# Patient Record
Sex: FEMALE | Race: WHITE | NOT HISPANIC OR LATINO | Employment: FULL TIME | URBAN - METROPOLITAN AREA
[De-identification: names, ages, dates, MRNs, and addresses within clinical notes are randomized per-mention and may not be internally consistent; named-entity substitution may affect disease eponyms.]

---

## 2023-10-17 ENCOUNTER — PROCEDURE VISIT (OUTPATIENT)
Dept: OBGYN CLINIC | Facility: CLINIC | Age: 28
End: 2023-10-17
Payer: COMMERCIAL

## 2023-10-17 VITALS
SYSTOLIC BLOOD PRESSURE: 114 MMHG | DIASTOLIC BLOOD PRESSURE: 72 MMHG | HEIGHT: 62 IN | WEIGHT: 162.8 LBS | BODY MASS INDEX: 29.96 KG/M2

## 2023-10-17 DIAGNOSIS — Z30.432 ENCOUNTER FOR IUD REMOVAL: Primary | ICD-10-CM

## 2023-10-17 PROBLEM — R27.0 ATAXIA: Status: ACTIVE | Noted: 2023-10-17

## 2023-10-17 PROCEDURE — 58301 REMOVE INTRAUTERINE DEVICE: CPT | Performed by: PHYSICIAN ASSISTANT

## 2023-10-17 NOTE — PROGRESS NOTES
Assessment/Plan:    Encounter for IUD removal  IUD removed intact without difficulty. Reviewed recommendation to start a prenatal vitamin with folic acid [id number] 1-3 months prior to conception to decrease risk of neural tube defects such as spina bifida. Call office with positive pregnancy test.   Return to office for annual or as needed. Problem List Items Addressed This Visit          Other    Encounter for IUD removal - Primary     IUD removed intact without difficulty. Reviewed recommendation to start a prenatal vitamin with folic acid [id number] 1-3 months prior to conception to decrease risk of neural tube defects such as spina bifida. Call office with positive pregnancy test.   Return to office for annual or as needed. Relevant Orders    Iud removal         Subjective:      Patient ID: Rose Marie Wang is a 33 y.o. female. HPI  32 yo seen for Mirena IUD removal. Patient had Mirena IUD inserted 2018. Planning to conceive. The following portions of the patient's history were reviewed and updated as appropriate: She  has no past medical history on file. She   Patient Active Problem List    Diagnosis Date Noted    Encounter for IUD removal 10/17/2023    Ataxia 10/17/2023    Viral meningitis 04/19/2016    Mononucleosis 04/16/2016     She  has a past surgical history that includes INSERTION OF INTRAUTERINE DEVICE (IUD) (02/2018). Her family history is not on file. She  reports that she has never smoked. She has never used smokeless tobacco. She reports current alcohol use. She reports that she does not use drugs.   Current Outpatient Medications   Medication Sig Dispense Refill    Biotin 10 MG TABS Take 1 tablet by mouth in the morning      Cholecalciferol (Vitamin D-3) 25 MCG (1000 UT) CAPS Take 1 capsule by mouth in the morning      ELDERBERRY PO Take 1 gum by mouth in the morning      levonorgestrel (MIRENA) 20 MCG/24HR IUD 1 each by Intrauterine route       No current facility-administered medications for this visit. She is allergic to amoxicillin, amoxicillin-pot clavulanate, and cefdinir. .    Review of Systems   Constitutional:  Negative for fatigue, fever and unexpected weight change. HENT:  Negative for dental problem and sinus pressure. Eyes:  Negative for visual disturbance. Respiratory:  Negative for cough, shortness of breath and wheezing. Cardiovascular:  Negative for chest pain. Gastrointestinal:  Negative for abdominal pain, blood in stool, constipation, diarrhea, nausea and vomiting. Endocrine: Negative for polydipsia. Genitourinary:  Negative for difficulty urinating, dyspareunia, dysuria, frequency, hematuria, pelvic pain and urgency. Musculoskeletal:  Negative for arthralgias and back pain. Neurological:  Negative for dizziness, seizures, light-headedness and headaches. Psychiatric/Behavioral:  Negative for suicidal ideas. The patient is not nervous/anxious. Objective:      /72 (BP Location: Left arm, Patient Position: Sitting, Cuff Size: Standard)   Ht 5' 2" (1.575 m)   Wt 73.8 kg (162 lb 12.8 oz)   Breastfeeding No   BMI 29.78 kg/m²          Physical Exam  Constitutional:       Appearance: She is well-developed. Genitourinary:     Labia:         Right: No rash, tenderness, lesion or injury. Left: No rash, tenderness, lesion or injury. Urethra: No prolapse, urethral pain, urethral swelling or urethral lesion. Vagina: No signs of injury and foreign body. No vaginal discharge, erythema, tenderness or bleeding. Cervix: No cervical motion tenderness, discharge or friability. Skin:     General: Skin is warm and dry. Coloration: Skin is not pale. Findings: No erythema or rash. Neurological:      Mental Status: She is alert and oriented to person, place, and time. Iud removal    Date/Time: 10/17/2023 8:20 AM    Performed by: Uriah Garrison PA-C  Authorized by:  Ryann Callahan MD Universal Protocol:  Consent: Verbal consent obtained. Written consent not obtained. Risks and benefits: risks, benefits and alternatives were discussed  Consent given by: patient  Time out: Immediately prior to procedure a "time out" was called to verify the correct patient, procedure, equipment, support staff and site/side marked as required. Timeout called at: 10/17/2023 10:25 AM.  Patient understanding: patient states understanding of the procedure being performed  Patient consent: the patient's understanding of the procedure matches consent given  Procedure consent: procedure consent matches procedure scheduled  Relevant documents: relevant documents present and verified  Test results: test results available and properly labeled  Required items: required blood products, implants, devices, and special equipment available  Patient identity confirmed: verbally with patient    Procedure:     Removed with no complications: yes      Other reason for removal:  Planning for conception.

## 2023-10-19 NOTE — ASSESSMENT & PLAN NOTE
IUD removed intact without difficulty. Reviewed recommendation to start a prenatal vitamin with folic acid [id number] 1-3 months prior to conception to decrease risk of neural tube defects such as spina bifida. Call office with positive pregnancy test.   Return to office for annual or as needed.

## 2023-11-09 ENCOUNTER — ANNUAL EXAM (OUTPATIENT)
Dept: OBGYN CLINIC | Facility: CLINIC | Age: 28
End: 2023-11-09
Payer: COMMERCIAL

## 2023-11-09 VITALS
DIASTOLIC BLOOD PRESSURE: 66 MMHG | SYSTOLIC BLOOD PRESSURE: 110 MMHG | HEIGHT: 63 IN | WEIGHT: 164 LBS | BODY MASS INDEX: 29.06 KG/M2

## 2023-11-09 DIAGNOSIS — Z01.419 ROUTINE GYNECOLOGICAL EXAMINATION: Primary | ICD-10-CM

## 2023-11-09 PROCEDURE — S0612 ANNUAL GYNECOLOGICAL EXAMINA: HCPCS | Performed by: OBSTETRICS & GYNECOLOGY

## 2023-11-09 NOTE — PROGRESS NOTES
[address]  [address] [place] [address], [address]    ASSESSMENT/PLAN: Ryan Sherman is a 33 y.o. [place] who presents for annual gynecologic exam.    Encounter for routine gynecologic examination  - Routine well woman exam completed today. - Cervical Cancer Screening: Current ASCCP Guidelines reviewed. Last Pap: 01/18/2022 . Next Pap Due: 2025  - HPV Vaccination status: Immunization series complete  - STI screening offered including HIV testing: offered, pt declined  - Contraceptive counseling discussed. Current contraception: no method:   - The following were reviewed in today's visit: breast self exam and family planning choices    Additional problems addressed during this visit:  1. Routine gynecological examination    IUD removed and now trying to conceive    CC:  Annual Gynecologic Examination    HPI: Ryan Sherman is a 33 y.o. [place] who presents for annual gynecologic examination. HPI    The following portions of the patient's history were reviewed and updated as appropriate: She  has no past medical history on file. She  has a past surgical history that includes INSERTION OF INTRAUTERINE DEVICE (IUD) (02/2018) and TONSILECTOMY AND ADNOIDECTOMY. Her family history includes Hyperlipidemia in her maternal grandmother. She  reports that she has never smoked. She has never used smokeless tobacco. She reports current alcohol use of about 3.0 standard drinks of alcohol per week. She reports that she does not use drugs. Current Outpatient Medications   Medication Sig Dispense Refill    Prenatal Vit-Fe Fumarate-FA (PRENATAL VITAMINS PO)        No current facility-administered medications for this visit. She is allergic to amoxicillin, amoxicillin-pot clavulanate, and cefdinir. .    ROS negative except as noted in HPI        Objective:  /66 (BP Location: Left arm, Patient Position: Sitting, Cuff Size: Standard)   Ht 5' 2.75" (1.594 m)   Wt 74.4 kg (164 lb)   LMP 11/02/2023 (Approximate)   Breastfeeding No   BMI 29.28 kg/m²    Physical Exam      PE:  General Appearance: alert and oriented, in no acute distress. HEENT: PERRL, thyroid without masses or tenderness  Breast: No masses, tenderness, skin changes, nipple D/C or axillary or supraclavicular adenopathy  Abdomen: Soft, non-tender, non-distended, no masses, no rebound or guarding. Pelvic:       External genitalia: Normal appearance, no abnormal pigmentation, no lesions or masses. Normal Bartholin's and Redford's. Urinary system: Urethral meatus normal, bladder non-tender. Vaginal: normal mucosa without prolapse or lesions. Normal-appearing physiologic discharge      Cervix: Normal-appearing, well-epithelialized, no gross lesions or masses No cervical motion tenderness. Adnexa: No adnexal masses or tenderness noted. Uterus: Normal-sized, regular contour, midline, mobile, no uterine tenderness. Extremities: Normal range of motion.    Skin: normal, no rash or abnormalities  Neurologic: alert, oriented x3  Psychiatric: Appropriate affect, mood stable, cooperative with exam.

## 2023-12-26 ENCOUNTER — TELEPHONE (OUTPATIENT)
Dept: OBGYN CLINIC | Facility: CLINIC | Age: 28
End: 2023-12-26

## 2023-12-26 NOTE — TELEPHONE ENCOUNTER
12/26/23 patient has an upcoming FPN appointment on 1/25/24 with Liseth campbell at 12:40 pm and 1/30/24 with Dr. Lopez 12:00 pm. LMP is  11/30/23, this is her first pregnancy. No MR needed as she is a patient of ours.

## 2024-01-25 ENCOUNTER — TELEMEDICINE (OUTPATIENT)
Dept: OBGYN CLINIC | Facility: CLINIC | Age: 29
End: 2024-01-25

## 2024-01-25 VITALS — HEIGHT: 62 IN | BODY MASS INDEX: 29.5 KG/M2 | WEIGHT: 160.3 LBS

## 2024-01-25 DIAGNOSIS — Z36.89 ENCOUNTER FOR OTHER SPECIFIED ANTENATAL SCREENING: Primary | ICD-10-CM

## 2024-01-25 PROCEDURE — OBC: Performed by: OBSTETRICS & GYNECOLOGY

## 2024-01-25 NOTE — PROGRESS NOTES
The patient was identified by name and date of birth. Eboni was informed that this is a telemedicine nurse OB intake visit and that the visit is being conducted through [employer] and patient was informed that this is a secure, HIPAA-compliant platform. She agrees to proceed.  My office door was closed. No one else was in the room. She acknowledged consent and understanding of privacy and security of the video platform. The patient has agreed to participate and understands they can discontinue the visit at any time.     OB INTAKE INTERVIEW  Patient is 28 y.o.who presents for OB intake at 8w0d  The father of her baby is her  Juan F.  This is a planned pregnancy      Last Menstrual Period: 23 8w0d KATIE 24 based on LMP  Ultrasound: scheduled for 24 with Dr Lopez  Estimated Date of Delivery: TBD with 24 ultrasound    Signs/Symptoms of Pregnancy  Current pregnancy symptoms: fatigue, nausea-advised avoiding an empty belly, 5-6 smaller meals, peppermint or jacob, B6 25mg BID/Unisom 12.5mg QHS  Constipation no  Headaches YES-frequent, advised increased fluids. Riboflavin 400mg/Magnesium 400mg  Cramping/spotting no  PICA cravings no    Diabetes-  There is no height or weight on file to calculate BMI.  If patient has 1 or more, please order early 1 hour GTT  History of GDM no  BMI >30 no  History of PCOS or current metformin use no  History of LGA/macrosomic infant (4000g/9lbs) no    If patient has 2 or more, please order early 1 hour GTT  AMA no  First degree relative with type 2 diabetes no  History of chronic HTN, hyperlipidemia, elevated A1C no  High risk race (, , ,  or ) no    Hypertension- if you answer yes, please order preeclampsia labs (cbc, comprehensive metabolic panel, urine protein creatinine ratio, uric acid)  History of of chronic HTN no  History of gestational HTN no  History of preeclampsia, eclampsia, or HELLP  syndrome no  History of diabetes no  History of lupus, autoimmune disease, kidney disease, antiphospholipid syndrome, rheumatoid arthritis, sjogren's syndrome no    Thyroid- if yes order TSH with reflex T4 (Unless TSH value on file within last 4 weeks then do not need to order)  History of thyroid disease no    Bleeding Disorder or Hx of DVT-patient or first degree relative with history of. Order the following if not done previously.   (Factor V, antithrombin III, prothrombin gene mutation, protein C and S Ag, lupus anticoagulant, anticardiolipin, beta-2 glycoprotein)   no    OB/GYN-  History of abnormal pap smear no  History of HPV no  History of Herpes/HSV no  History of other STI (gonorrhea, chlamydia, trich) (or current partner with Hep B, Hep C, or HIV) no  History of prior  no  History of prior  no  History of  delivery prior to 36 weeks 6 days no  History of blood transfusion no  Ok for blood transfusion YES    Substance screening-   History of tobacco use no  Currently using tobacco no  Currently using alcohol no  Presently using drugs no  Past drug use (if yes, order urine drug screening panel)  no  IV drug use (if yes, order urine drug screening panel) no  Partner drug use (if yes, order urine drug screening panel) no  Parent/Family drug use (do not order urine drug screening panel just for family hx) no    Depression Screening-  PHQ-9 Score: 0    MRSA Screening-   Does the pt have a hx of MRSA? no  If yes- please follow MRSA protocol and obtain a nasal swab for MRSA culture at 12 week visit.    Immunizations:  Influenza vaccine given this season unsure if she wants  Discussed Tdap vaccine YES-pt states she has an allergy to Tetanus  Discussed COVID Vaccine had x2    Genetic/MFM-  Do you or your partner have a history of any of the following in yourselves or first degree relatives?  Cystic fibrosis no  Spinal muscular atrophy no  Hemoglobinopathy/Sickle Cell/Thalassemia no  Fragile X  Intellectual Disability no    If yes, discuss carrier screening and recommend consultation with [facility]/genetic counseling.  If no, discuss option for carrier screening and/or genetic testing with Nuchal Ultrasound. Patient interested YES  Appointment at [facility] made NO-to be provided at 24 appointment-pended    Interview education  [facility] Pregnancy Essentials Book reviewed and discussed YES      Prenatal lab work scripts NO-to be provided at 24 appointment-pended    Extra labs ordered:interested in CF/SMA-provided [facility] Patient Navigator # [phone]      Details that I feel the provider should be aware of: , planned pregnancy,  Juan F. Last PAP 2022. Undecided if she wants the flu vaccine. Intake was done virtually. All prenatal labs and referrals are pended. Please provide patient with scripts and blue folder at appointment on 24.    The patient was oriented to our practice, reviewed delivering physicians and [facility] [place] for Delivery. All questions were answered.    Interviewed by: Sara Mendoza RN

## 2024-01-25 NOTE — PATIENT INSTRUCTIONS
Congratulations!! Please review our Pregnancy Essential Guide for informative education and here is a link to take a irtual tour of our [place] [facility].     [facility] P[facility] Guide  [facility] ([email or web address])       [facility] [place] [facility] Tour on [facility]

## 2024-01-30 ENCOUNTER — INITIAL PRENATAL (OUTPATIENT)
Dept: OBGYN CLINIC | Facility: CLINIC | Age: 29
End: 2024-01-30
Payer: COMMERCIAL

## 2024-01-30 VITALS
WEIGHT: 164 LBS | SYSTOLIC BLOOD PRESSURE: 118 MMHG | DIASTOLIC BLOOD PRESSURE: 80 MMHG | HEIGHT: 62 IN | BODY MASS INDEX: 30.18 KG/M2

## 2024-01-30 DIAGNOSIS — Z3A.08 8 WEEKS GESTATION OF PREGNANCY: ICD-10-CM

## 2024-01-30 DIAGNOSIS — Z36.87 UNSURE OF LMP (LAST MENSTRUAL PERIOD) AS REASON FOR ULTRASOUND SCAN: ICD-10-CM

## 2024-01-30 DIAGNOSIS — Z36.89 ENCOUNTER FOR OTHER SPECIFIED ANTENATAL SCREENING: ICD-10-CM

## 2024-01-30 DIAGNOSIS — Z13.71 SCREENING FOR GENETIC DISEASE CARRIER STATUS: ICD-10-CM

## 2024-01-30 DIAGNOSIS — Z34.01 ENCOUNTER FOR SUPERVISION OF NORMAL FIRST PREGNANCY IN FIRST TRIMESTER: Primary | ICD-10-CM

## 2024-01-30 PROBLEM — Z30.432 ENCOUNTER FOR IUD REMOVAL: Status: RESOLVED | Noted: 2023-10-17 | Resolved: 2024-01-30

## 2024-01-30 LAB
EXTERNAL ANTIBODY SCREEN: NORMAL
EXTERNAL CHLAMYDIA SCREEN: NORMAL
EXTERNAL GONORRHEA SCREEN: NORMAL
EXTERNAL HEMOGLOBIN: 13.7 G/DL
EXTERNAL HEPATITIS B SURFACE ANTIGEN: NORMAL
EXTERNAL HIV SCREEN: NORMAL
EXTERNAL HIV-1 AB: NORMAL
EXTERNAL HIV-2 AB: NORMAL
EXTERNAL PLATELET COUNT: 252 K/ÂΜL
EXTERNAL RH FACTOR: POSITIVE
EXTERNAL RUBELLA IGG QUANTITATION: 2.58
EXTERNAL SYPHILIS TOTAL IGG/IGM SCREENING: NORMAL
SL AMB  POCT GLUCOSE, UA: NORMAL
SL AMB POCT URINE PROTEIN: NORMAL

## 2024-01-30 PROCEDURE — 76817 TRANSVAGINAL US OBSTETRIC: CPT | Performed by: OBSTETRICS & GYNECOLOGY

## 2024-01-30 PROCEDURE — 81002 URINALYSIS NONAUTO W/O SCOPE: CPT | Performed by: OBSTETRICS & GYNECOLOGY

## 2024-01-30 PROCEDURE — PNV: Performed by: OBSTETRICS & GYNECOLOGY

## 2024-01-30 NOTE — PROGRESS NOTES
Routine Prenatal Visit  [facility] - [address], [address], [place] [address]    Assessment/Plan:  Eboni is a 28 y.o. year old  at 8w5d who presents for routine prenatal visit.     1. Encounter for supervision of normal first pregnancy in first trimester    2. 8 weeks gestation of pregnancy  Assessment & Plan:  TV U/S today is consistent with LMP.    Orders:  -     Ambulatory referral to social work care management program; Future  -     Ambulatory Referral to Maternal Fetal Medicine; Future; Expected date: 2024  -     Urine culture; Future  -     Hepatitis C Ab W/Refl To HCV RNA, Qn, PCR; Future  -     Obstetric Panel W/Fourth Generation HIV; Future  -     Urine culture  -     Hepatitis C Ab W/Refl To HCV RNA, Qn, PCR  -     Obstetric Panel W/Fourth Generation HIV  -     POCT urine dip    3. Encounter for other specified  screening  -     Ambulatory referral to social work care management program; Future  -     Ambulatory Referral to Maternal Fetal Medicine; Future; Expected date: 2024  -     Urine culture; Future  -     Hepatitis C Ab W/Refl To HCV RNA, Qn, PCR; Future  -     Obstetric Panel W/Fourth Generation HIV; Future  -     Urine culture  -     Hepatitis C Ab W/Refl To HCV RNA, Qn, PCR  -     Obstetric Panel W/Fourth Generation HIV  -     Chlamydia/GC amplified DNA by PCR    4. Screening for genetic disease carrier status    5. Unsure of LMP (last menstrual period) as reason for ultrasound scan  -     AMB US OB < 14 weeks single or first gestation level 1          Subjective:     CC: Prenatal care    Eboni Hilton is a 28 y.o.  female who presents for routine prenatal care at 8w5d.  Pregnancy ROS: no leakage of fluid, pelvic pain, or vaginal bleeding.  no fetal movement.    The following portions of the patient's history were reviewed and updated as appropriate: allergies, current medications, past family history, past medical history, obstetric  "history, gynecologic history, past social history, past surgical history and problem list.      Objective:  /80 (BP Location: Right arm, Patient Position: Sitting, Cuff Size: Standard)   Ht 5' 2\" (1.575 m)   Wt 74.4 kg (164 lb)   LMP 2023 (Exact Date)   BMI 30.00 kg/m²   Pregravid Weight/BMI: 72.6 kg (160 lb) (BMI 29.26)  Current Weight: 74.4 kg (164 lb)   Total Weight Gain: 1.814 kg (4 lb)   Pre- Vitals      Flowsheet Row Most Recent Value   Prenatal Assessment    Prenatal Vitals    Blood Pressure 118/80   Weight - Scale 74.4 kg (164 lb)   Urine Albumin/Glucose    Dilation/Effacement/Station    Vaginal Drainage    Edema              General: Well appearing, no distress  Respiratory: Unlabored breathing  Cardiovascular: Regular rate.  Abdomen: Soft, gravid, nontender  Fundal Height: Appropriate for gestational age.  Extremities: Warm and well perfused.  Non tender.  "

## 2024-01-31 LAB
C TRACH RRNA SPEC QL NAA+PROBE: NOT DETECTED
N GONORRHOEA RRNA SPEC QL NAA+PROBE: NOT DETECTED

## 2024-02-02 ENCOUNTER — PATIENT OUTREACH (OUTPATIENT)
Dept: OBGYN CLINIC | Facility: CLINIC | Age: 29
End: 2024-02-02

## 2024-02-02 LAB
ABO GROUP BLD: NORMAL
BASOPHILS # BLD AUTO: 38 CELLS/UL (ref 0–200)
BASOPHILS NFR BLD AUTO: 0.4 %
BLD GP AB SCN SERPL QL: NORMAL
EOSINOPHIL # BLD AUTO: 57 CELLS/UL (ref 15–500)
EOSINOPHIL NFR BLD AUTO: 0.6 %
ERYTHROCYTE [DISTWIDTH] IN BLOOD BY AUTOMATED COUNT: 11.8 % (ref 11–15)
HBV SURFACE AG SERPL QL IA: NORMAL
HCT VFR BLD AUTO: 41.9 % (ref 35–45)
HCV AB SERPL QL IA: NORMAL
HGB BLD-MCNC: 13.7 G/DL (ref 11.7–15.5)
HIV 1+2 AB+HIV1 P24 AG SERPL QL IA: NORMAL
LYMPHOCYTES # BLD AUTO: 2119 CELLS/UL (ref 850–3900)
LYMPHOCYTES NFR BLD AUTO: 22.3 %
MCH RBC QN AUTO: 29.4 PG (ref 27–33)
MCHC RBC AUTO-ENTMCNC: 32.7 G/DL (ref 32–36)
MCV RBC AUTO: 89.9 FL (ref 80–100)
MONOCYTES # BLD AUTO: 551 CELLS/UL (ref 200–950)
MONOCYTES NFR BLD AUTO: 5.8 %
NEUTROPHILS # BLD AUTO: 6736 CELLS/UL (ref 1500–7800)
NEUTROPHILS NFR BLD AUTO: 70.9 %
PLATELET # BLD AUTO: 252 THOUSAND/UL (ref 140–400)
PMV BLD REES-ECKER: 11.6 FL (ref 7.5–12.5)
RBC # BLD AUTO: 4.66 MILLION/UL (ref 3.8–5.1)
RH BLD: NORMAL
RPR SER QL: NORMAL
RUBV IGG SERPL IA-ACNC: 2.58 INDEX
SPECIMEN SOURCE: NORMAL
WBC # BLD AUTO: 9.5 THOUSAND/UL (ref 3.8–10.8)

## 2024-02-02 NOTE — PROGRESS NOTES
SW referred for initial prenatal assessment. Patient is , 9w1dGA with an KATIE of 24. SW called patient to complete assessment - no answer. SW left  requesting a call back. Patient's next appointment is scheduled for 6:15PM on 24 at [facility].     Addendum -     Patient called SW back and was available to complete assessment. Patient reports this is a planned pregnancy. She and FOB ( Juan F) live in [place] with their dog, but plan to move back to [place] soon. Patient and FOB both drive. 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 Patient denies current financial concerns or need for MA/WIC/SNAP information, parenting education, or baby supply resources.     Patient denies current or h/o mental health, substance use, DV/IPV, CYS involvement, and legal concerns. She enjoys working out for stress relief. She indicates good support from FOB, her family, and FOB's family.     No SW needs identified at this time, SW closing referral. Please re-refer as needed.

## 2024-02-06 ENCOUNTER — TELEPHONE (OUTPATIENT)
Dept: OBGYN CLINIC | Facility: CLINIC | Age: 29
End: 2024-02-06

## 2024-02-06 DIAGNOSIS — Z34.01 ENCOUNTER FOR SUPERVISION OF NORMAL FIRST PREGNANCY IN FIRST TRIMESTER: Primary | ICD-10-CM

## 2024-02-06 NOTE — TELEPHONE ENCOUNTER
[facility] l/m [facility] Ref# [id number].  Per lab results urine culture not performed due to inadequate specimen. Contacted [facility] lab to recall patient to lab for missed specimen. On hold >10 minutes to confirm if patient has been notified for recall. This nurse disconnected on hold call. L/M on Eboni's v/m will need to return to lab to provide specimen for urine culture to be completed. New order generated. Call back if you have any questions of concerns.

## 2024-02-08 ENCOUNTER — TELEPHONE (OUTPATIENT)
Dept: OBGYN CLINIC | Facility: CLINIC | Age: 29
End: 2024-02-08

## 2024-02-08 NOTE — TELEPHONE ENCOUNTER
Spoke with patient and advised she may print order for urine culture through [employer]. If any further assistance is needed, contact the office.

## 2024-02-08 NOTE — TELEPHONE ENCOUNTER
----- Message from Eboni Hilton sent at 2/7/2024  5:19 PM EST -----  Regarding: urine culture  Contact: [phone]  Rick Jolley! Is there any way I can have the script emailed to me or somehow uploaded on my chart that I can print it? I was going to send the urine culture from my work to [employer] just because it’s easier for me to do that than to get over to [facility] thank you in advance!   -Eboni Hilton

## 2024-02-13 LAB — SPECIMEN SOURCE: NORMAL

## 2024-02-14 ENCOUNTER — TELEPHONE (OUTPATIENT)
Dept: OBGYN CLINIC | Facility: CLINIC | Age: 29
End: 2024-02-14

## 2024-02-14 DIAGNOSIS — R30.0 DYSURIA DURING PREGNANCY IN FIRST TRIMESTER: ICD-10-CM

## 2024-02-14 DIAGNOSIS — Z36.89 ENCOUNTER FOR OTHER SPECIFIED ANTENATAL SCREENING: Primary | ICD-10-CM

## 2024-02-14 DIAGNOSIS — O26.891 DYSURIA DURING PREGNANCY IN FIRST TRIMESTER: ICD-10-CM

## 2024-02-14 NOTE — TELEPHONE ENCOUNTER
Caller reports urine culture was not performed on the specimen collected on 2/6/2024 at 0915 as the specimen was not suitable.    They will be sending a fax shortly that explains.

## 2024-02-26 ENCOUNTER — ROUTINE PRENATAL (OUTPATIENT)
Dept: OBGYN CLINIC | Facility: CLINIC | Age: 29
End: 2024-02-26
Payer: COMMERCIAL

## 2024-02-26 VITALS
WEIGHT: 166 LBS | SYSTOLIC BLOOD PRESSURE: 114 MMHG | DIASTOLIC BLOOD PRESSURE: 64 MMHG | BODY MASS INDEX: 30.55 KG/M2 | HEIGHT: 62 IN

## 2024-02-26 DIAGNOSIS — Z3A.12 12 WEEKS GESTATION OF PREGNANCY: Primary | ICD-10-CM

## 2024-02-26 DIAGNOSIS — Z34.01 ENCOUNTER FOR SUPERVISION OF NORMAL FIRST PREGNANCY IN FIRST TRIMESTER: ICD-10-CM

## 2024-02-26 LAB
SL AMB  POCT GLUCOSE, UA: NORMAL
SL AMB POCT URINE PROTEIN: NORMAL

## 2024-02-26 PROCEDURE — 81002 URINALYSIS NONAUTO W/O SCOPE: CPT | Performed by: OBSTETRICS & GYNECOLOGY

## 2024-02-26 PROCEDURE — PNV: Performed by: OBSTETRICS & GYNECOLOGY

## 2024-02-27 NOTE — PROGRESS NOTES
"Routine Prenatal Visit  [facility] - [address], [address], [place] [address]    Assessment/Plan:  Eboni is a 28 y.o. year old  at 12w4d who presents for routine prenatal visit.     1. 12 weeks gestation of pregnancy  -     POCT urine dip    2. Encounter for supervision of normal first pregnancy in first trimester        Next OB Visit 4 weeks.    Subjective:     CC: Prenatal care    Eboni Hilton is a 28 y.o.  female who presents for routine prenatal care at 12w4d.  Pregnancy ROS: no leakage of fluid, pelvic pain, or vaginal bleeding.  normal fetal movement.    The following portions of the patient's history were reviewed and updated as appropriate: allergies, current medications, past family history, past medical history, obstetric history, gynecologic history, past social history, past surgical history and problem list.      Objective:  /64 (BP Location: Left arm, Patient Position: Sitting, Cuff Size: Standard)   Ht 5' 2\" (1.575 m)   Wt 75.3 kg (166 lb)   LMP 2023 (Exact Date)   BMI 30.36 kg/m²   Pregravid Weight/BMI: 72.6 kg (160 lb) (BMI 29.26)  Current Weight: 75.3 kg (166 lb)   Total Weight Gain: 2.722 kg (6 lb)   Pre-Jordin Vitals      Flowsheet Row Most Recent Value   Prenatal Assessment    Prenatal Vitals    Blood Pressure 114/64   Weight - Scale 75.3 kg (166 lb)   Urine Albumin/Glucose    Dilation/Effacement/Station    Vaginal Drainage    Edema              General: Well appearing, no distress  Abdomen: Soft, gravid, nontender  Extremities: Non tender.  "

## 2024-02-28 ENCOUNTER — ROUTINE PRENATAL (OUTPATIENT)
Dept: PERINATAL CARE | Facility: OTHER | Age: 29
End: 2024-02-28
Payer: COMMERCIAL

## 2024-02-28 ENCOUNTER — TELEPHONE (OUTPATIENT)
Facility: HOSPITAL | Age: 29
End: 2024-02-28

## 2024-02-28 VITALS
HEART RATE: 64 BPM | HEIGHT: 62 IN | BODY MASS INDEX: 31.1 KG/M2 | DIASTOLIC BLOOD PRESSURE: 72 MMHG | SYSTOLIC BLOOD PRESSURE: 122 MMHG | WEIGHT: 169 LBS

## 2024-02-28 DIAGNOSIS — Z3A.08 8 WEEKS GESTATION OF PREGNANCY: ICD-10-CM

## 2024-02-28 DIAGNOSIS — Z3A.12 12 WEEKS GESTATION OF PREGNANCY: ICD-10-CM

## 2024-02-28 DIAGNOSIS — Z36.82 ENCOUNTER FOR ANTENATAL SCREENING FOR NUCHAL TRANSLUCENCY: Primary | ICD-10-CM

## 2024-02-28 PROCEDURE — 99243 OFF/OP CNSLTJ NEW/EST LOW 30: CPT | Performed by: OBSTETRICS & GYNECOLOGY

## 2024-02-28 PROCEDURE — 76801 OB US < 14 WKS SINGLE FETUS: CPT | Performed by: OBSTETRICS & GYNECOLOGY

## 2024-02-28 PROCEDURE — 76813 OB US NUCHAL MEAS 1 GEST: CPT | Performed by: OBSTETRICS & GYNECOLOGY

## 2024-02-28 PROCEDURE — 36415 COLL VENOUS BLD VENIPUNCTURE: CPT | Performed by: OBSTETRICS & GYNECOLOGY

## 2024-02-28 NOTE — TELEPHONE ENCOUNTER
Unable to reach patient by phone, left voicemail explaining our [facility] office has had a slight change in scheduling. We rescheduled her appointment to February 28 @ 10:15 a.m. at our [facility] [address]. If this is not convenient, please contact our office at [phone].    We do apologize for any inconvenience.

## 2024-02-28 NOTE — LETTER
February 28, 2024     Rebekha Dalal DO  [address]  [address]  [facility] [address]    Patient: Eboni Hilton   YOB: 1995   Date of Visit: 2/28/2024       Dear Dr. Dalal:    Thank you for referring Eboni Hilton to me for evaluation. Below are my notes for this consultation.    If you have questions, please do not hesitate to call me. I look forward to following your patient along with you.         Sincerely,        Jarrod Pond MD        CC: No Recipients    Jarrod Pond MD  2/28/2024  1:00 PM  Sign when Signing Visit  Please refer to the [facility] ultrasound report in Ob Procedures for additional information regarding today's visit

## 2024-02-28 NOTE — PROGRESS NOTES
Patient chose to have [facility] DcyvesaR27 Non-Invasive Prenatal Screen [id number] PLUS w/ SCA, WITH fetal sex.  Patient choose billed through insurance.     Patient given brochure and is aware [facility] will contact patient's insurance and coordinate coverage.  Provided [facility] contact information. General inquiries [phone], Cost estimates [phone] and [facility] Billing [phone]. Website [email or web address].     Blood collection tubes labeled with patient identifiers (name, medical record number, and date of birth).     Filled out [facility] order form. Patient chose to have blood drawn in our Maternal Fetal Medicine office. Blood work drawn by ARTIE Gross MA. .   If patient had blood work in office: Blood drawn from left arm. Needle used with a straight needle.   If patient chose to have blood work drawn at a [facility] lab we requested patient notify MFM (via phone call or [employer] message) when blood collected so office can follow up on results.     Copy of lab order scanned to Epic media.     Maternal Fetal Medicine will have results in approximately 5-7 business days and will call patient or notify via [employer] MXR.  Patient aware viewing lab result online will reveal fetal sex if ordered.    Patient verbalized understanding of all instructions and no questions at this time.

## 2024-02-28 NOTE — PROGRESS NOTES
Please refer to the [facility] ultrasound report in Ob Procedures for additional information regarding today's visit

## 2024-03-03 ENCOUNTER — NURSE TRIAGE (OUTPATIENT)
Dept: OTHER | Facility: OTHER | Age: 29
End: 2024-03-03

## 2024-03-03 NOTE — TELEPHONE ENCOUNTER
"Regardin wks pregnant / Flu like symptoms  ----- Message from Shay Villa sent at 3/3/2024  6:41 PM EST -----  \"I am 14wks pregnant, and I am starting to develop body aches and chest cold. I am not sure what can I take.\"    "

## 2024-03-03 NOTE — TELEPHONE ENCOUNTER
"Reason for Disposition  • Cough with cold symptoms (e.g., runny nose, postnasal drip, throat clearing)    Answer Assessment - Initial Assessment Questions  1. ONSET: \"When did the cough begin?\"       Just today around 1200    2. SEVERITY: \"How bad is the cough today?\"       Not very severe    3. SPUTUM: \"Describe the color of your sputum\" (none, dry cough; clear, white, yellow, green)      Not coughing anything up at this time.    4. HEMOPTYSIS: \"Are you coughing up any blood?\" If so ask: \"How much?\" (flecks, streaks, tablespoons, etc.)      Denies    5. DIFFICULTY BREATHING: \"Are you having difficulty breathing?\" If Yes, ask: \"How bad is it?\" (e.g., mild, moderate, severe)     - MILD: No SOB at rest, mild SOB with walking, speaks normally in sentences, can lay down, no retractions, pulse < 100.     - MODERATE: SOB at rest, SOB with minimal exertion and prefers to sit, cannot lie down flat, speaks in phrases, mild retractions, audible wheezing, pulse 100-120.     - SEVERE: Very SOB at rest, speaks in single words, struggling to breathe, sitting hunched forward, retractions, pulse > 120       Difficulty breathing    6. FEVER: \"Do you have a fever?\" If Yes, ask: \"What is your temperature, how was it measured, and when did it start?\"      Denies    7. CARDIAC HISTORY: \"Do you have any history of heart disease?\" (e.g., heart attack, congestive heart failure)       Denies    8. LUNG HISTORY: \"Do you have any history of lung disease?\"  (e.g., pulmonary embolus, asthma, emphysema)      Denies    9. PE RISK FACTORS: \"Do you have a history of blood clots?\" (or: recent major surgery, recent prolonged travel, bedridden)      Denies    10. OTHER SYMPTOMS: \"Do you have any other symptoms?\" (e.g., runny nose, wheezing, chest pain)        Body aches , chills, chest congestion, mild cough.    11. PREGNANCY: \"Is there any chance you are pregnant?\" \"When was your last menstrual period?\"        14 weeks    12. TRAVEL: \"Have you " "traveled out of the country in the last month?\" (e.g., travel history, exposures)        Denies    Protocols used: Cough - Acute Non-Productive-ADULT-AH    "

## 2024-03-04 LAB
CFDNA.FET/CFDNA.TOTAL SFR FETUS: NORMAL %
CITATION REF LAB TEST: NORMAL
FET 13+18+21+X+Y ANEUP PLAS.CFDNA: NEGATIVE
FET CHR 21 TS PLAS.CFDNA QL: NEGATIVE
FET CHR 21 TS PLAS.CFDNA QL: NEGATIVE
FET MS X RISK WBC.DNA+CFDNA QL: NOT DETECTED
FET SEX PLAS.CFDNA DOSAGE CFDNA: NORMAL
FET TS 13 RISK PLAS.CFDNA QL: NEGATIVE
FET X + Y ANEUP RISK PLAS.CFDNA SEQ-IMP: NOT DETECTED
GA EST FROM CONCEPTION DATE: NORMAL D
GESTATIONAL AGE > 9:: YES
LAB DIRECTOR NAME PROVIDER: NORMAL
LAB DIRECTOR NAME PROVIDER: NORMAL
LABORATORY COMMENT REPORT: NORMAL
LIMITATIONS OF THE TEST: NORMAL
NEGATIVE PREDICTIVE VALUE: NORMAL
PERFORMANCE CHARACTERISTICS: NORMAL
POSITIVE PREDICTIVE VALUE: NORMAL
REF LAB TEST METHOD: NORMAL
SL AMB NOTE:: NORMAL
TEST PERFORMANCE INFO SPEC: NORMAL

## 2024-03-05 ENCOUNTER — NURSE TRIAGE (OUTPATIENT)
Age: 29
End: 2024-03-05

## 2024-03-05 ENCOUNTER — OFFICE VISIT (OUTPATIENT)
Dept: URGENT CARE | Facility: CLINIC | Age: 29
End: 2024-03-05
Payer: COMMERCIAL

## 2024-03-05 VITALS
WEIGHT: 165.6 LBS | OXYGEN SATURATION: 97 % | RESPIRATION RATE: 18 BRPM | SYSTOLIC BLOOD PRESSURE: 126 MMHG | TEMPERATURE: 99.6 F | HEART RATE: 101 BPM | DIASTOLIC BLOOD PRESSURE: 68 MMHG | BODY MASS INDEX: 30.29 KG/M2

## 2024-03-05 DIAGNOSIS — R50.9 FEVER, UNSPECIFIED FEVER CAUSE: Primary | ICD-10-CM

## 2024-03-05 DIAGNOSIS — J06.9 UPPER RESPIRATORY TRACT INFECTION, UNSPECIFIED TYPE: ICD-10-CM

## 2024-03-05 LAB
SARS-COV-2 AG UPPER RESP QL IA: NEGATIVE
VALID CONTROL: NORMAL

## 2024-03-05 PROCEDURE — 87636 SARSCOV2 & INF A&B AMP PRB: CPT | Performed by: FAMILY MEDICINE

## 2024-03-05 PROCEDURE — 99213 OFFICE O/P EST LOW 20 MIN: CPT | Performed by: FAMILY MEDICINE

## 2024-03-05 PROCEDURE — 87811 SARS-COV-2 COVID19 W/OPTIC: CPT | Performed by: FAMILY MEDICINE

## 2024-03-05 NOTE — TELEPHONE ENCOUNTER
"Patient called back with continued symptoms of cold with now painful cough and green sputum. Advised patient be evaluated at Urgent Care to r/o bacterial infection.    Reason for Disposition   Sinus pain (not just congestion) and fever    Answer Assessment - Initial Assessment Questions  1. ONSET: \"When did the nasal discharge start?\"       3/3/24  2. AMOUNT: \"How much discharge is there?\"       moderate  3. COUGH: \"Do you have a cough?\" If yes, ask: \"Describe the color of your sputum\" (clear, white, yellow, green)      Green sputum  4. RESPIRATORY DISTRESS: \"Describe your breathing.\"       Easy, non labored  5. FEVER: \"Do you have a fever?\" If Yes, ask: \"What is your temperature, how was it measured, and when did it start?\"      100.5, has been medicating with Tylenol 500 mg q5-6 hours    7. OTHER SYMPTOMS: \"Do you have any other symptoms?\" (e.g., sore throat, earache, wheezing, vomiting)      Chest throat sore, painful cough with green sputum  8. PREGNANCY: \"Is there any chance you are pregnant?\" \"When was your last menstrual period?\"      13w5d    Protocols used: Common Cold-ADULT-[place]    "

## 2024-03-05 NOTE — PROGRESS NOTES
[facility]        NAME: Eboni Hilton is a 28 y.o. female  : 1995    MRN: [id number]  DATE: 2024  TIME: 10:44 AM    Assessment and Plan   Fever, unspecified fever cause [R50.9]  1. Fever, unspecified fever cause  Poct Covid 19 Rapid Antigen Test    Covid/Flu-Office Collect      2. Upper respiratory tract infection, unspecified type              Patient Instructions       Follow up with PCP in 3-5 days.  Proceed to  ER if symptoms worsen.    If tests have been performed at [facility], our office will contact you with results if changes need to be made to the care plan discussed with you at the visit.  You can review your full results on [facility].    Chief Complaint     Chief Complaint   Patient presents with    Fever    Headache    Cough    chest congestion    Chills    Generalized Body Aches     Patient reports symptoms started 2 days ago. 14 weeks pregnant, took tylenol around an hour ago.         History of Present Illness       28-year-old female currently 13.5 GA with 3-day history of cough, increased nasal congestion and fevers.  She has been using Tylenol for fevers which has helped to decrease her temperature.  Cough is now green and productive.  Denies any headaches nausea or vomiting.    Fever  Associated symptoms include congestion, coughing and headaches.   Headache  Cough  Associated symptoms include headaches.   Generalized Body Aches  Associated symptoms include congestion, headaches and coughing.       Review of Systems   Review of Systems   Constitutional: Negative.    HENT:  Positive for congestion and sinus pressure.    Eyes: Negative.    Respiratory:  Positive for cough.    Cardiovascular: Negative.    Gastrointestinal: Negative.    Genitourinary: Negative.    Skin: Negative.    Allergic/Immunologic: Negative.    Neurological:  Positive for headaches.   Hematological: Negative.    Psychiatric/Behavioral: Negative.           Current Medications       Current  Outpatient Medications:     Prenatal Vit-Fe Fumarate-FA (PRENATAL VITAMINS PO), , Disp: , Rfl:     Current Allergies     Allergies as of 03/05/2024 - Reviewed 03/05/2024   Allergen Reaction Noted    Amoxicillin Other (See Comments) 08/26/2019    Amoxicillin-pot clavulanate Other (See Comments) 04/10/2016    Cefdinir Other (See Comments) 04/16/2016    Tetanus toxoids Swelling 01/25/2024            The following portions of the patient's history were reviewed and updated as appropriate: allergies, current medications, past family history, past medical history, past social history, past surgical history and problem list.     Past Medical History:   Diagnosis Date    Varicella     as child       Past Surgical History:   Procedure Laterality Date    TONSILECTOMY AND ADNOIDECTOMY         Family History   Problem Relation Age of Onset    Hyperlipidemia Maternal Grandmother     Lung cancer Maternal Grandmother          Medications have been verified.        Objective   /68   Pulse 101   Temp 99.6 °F (37.6 °C) (Tympanic)   Resp 18   Wt 75.1 kg (165 lb 9.6 oz)   LMP 11/30/2023 (Exact Date)   SpO2 97%   BMI 30.29 kg/m²   Patient's last menstrual period was 11/30/2023 (exact date).       Physical Exam     Physical Exam  Vitals and nursing note reviewed.   Constitutional:       Appearance: She is well-developed.   HENT:      Head: Normocephalic.      Nose: Nose normal.      Mouth/Throat:      Pharynx: No oropharyngeal exudate.   Eyes:      Pupils: Pupils are equal, round, and reactive to light.   Cardiovascular:      Rate and Rhythm: Normal rate and regular rhythm.   Pulmonary:      Effort: Pulmonary effort is normal.      Breath sounds: Normal breath sounds. No wheezing.   Abdominal:      General: Abdomen is flat.   Musculoskeletal:         General: Normal range of motion.      Cervical back: Normal range of motion.   Lymphadenopathy:      Cervical: No cervical adenopathy.   Skin:     General: Skin is warm and dry.    Neurological:      Mental Status: She is alert and oriented to person, place, and time.

## 2024-03-05 NOTE — TELEPHONE ENCOUNTER
Eboni called to f/u to urgent care visit. She was not prescribed antibiotic but advised to take mucinex and an antihistamine. Confirmed ok for mucinex plain. Can also use plain zyrtec or claritin.

## 2024-03-06 LAB
FLUAV RNA RESP QL NAA+PROBE: POSITIVE
FLUBV RNA RESP QL NAA+PROBE: NEGATIVE
SARS-COV-2 RNA RESP QL NAA+PROBE: NEGATIVE

## 2024-03-25 ENCOUNTER — ROUTINE PRENATAL (OUTPATIENT)
Dept: OBGYN CLINIC | Facility: CLINIC | Age: 29
End: 2024-03-25
Payer: COMMERCIAL

## 2024-03-25 VITALS
SYSTOLIC BLOOD PRESSURE: 112 MMHG | WEIGHT: 170 LBS | DIASTOLIC BLOOD PRESSURE: 68 MMHG | BODY MASS INDEX: 31.28 KG/M2 | HEIGHT: 62 IN

## 2024-03-25 DIAGNOSIS — Z34.01 ENCOUNTER FOR SUPERVISION OF NORMAL FIRST PREGNANCY IN FIRST TRIMESTER: Primary | ICD-10-CM

## 2024-03-25 DIAGNOSIS — Z3A.16 16 WEEKS GESTATION OF PREGNANCY: ICD-10-CM

## 2024-03-25 LAB
SL AMB  POCT GLUCOSE, UA: NORMAL
SL AMB POCT URINE PROTEIN: NORMAL

## 2024-03-25 PROCEDURE — PNV: Performed by: OBSTETRICS & GYNECOLOGY

## 2024-03-25 PROCEDURE — 81002 URINALYSIS NONAUTO W/O SCOPE: CPT | Performed by: OBSTETRICS & GYNECOLOGY

## 2024-03-25 NOTE — PROGRESS NOTES
"Routine Prenatal Visit  [facility] [address], [address], [place] [address]    Assessment/Plan:  Eboni is a 28 y.o. year old  at 16w4d who presents for routine prenatal visit.     1. Encounter for supervision of normal first pregnancy in first trimester    2. 16 weeks gestation of pregnancy  Assessment & Plan:  AFP ordered  Has anatomy scheduled    Orders:  -     POCT urine dip  -     Alpha fetoprotein, maternal; Future  -     Alpha fetoprotein, maternal          Subjective:   Eboni Hilton is a 28 y.o.  who presents for routine prenatal care at 16w4d.  Complaints today: -  LOF: -; VB: -; Contractions: -; FM: -    Objective:  /68 (BP Location: Left arm, Patient Position: Sitting, Cuff Size: Standard)   Ht 5' 2\" (1.575 m)   Wt 77.1 kg (170 lb)   LMP 2023 (Exact Date)   BMI 31.09 kg/m²     General: Well appearing, no distress  Respiratory: Unlabored breathing  Abdomen: Soft, gravid, nontender  Extremities: Warm and well perfused.  Non tender.    Pregravid Weight/BMI: 72.6 kg (160 lb) (BMI 29.26)  Current Weight: 77.1 kg (170 lb)   Total Weight Gain: 4.536 kg (10 lb)     Pre-Jordin Vitals      Flowsheet Row Most Recent Value   Prenatal Assessment    Fetal Heart Rate 152   Movement Absent   Prenatal Vitals    Blood Pressure 112/68   Weight - Scale 77.1 kg (170 lb)   Urine Albumin/Glucose    Dilation/Effacement/Station    Vaginal Drainage    Edema              Raghav Romeo DO  3/25/2024 5:58 PM    "

## 2024-04-07 ENCOUNTER — NURSE TRIAGE (OUTPATIENT)
Dept: OTHER | Facility: OTHER | Age: 29
End: 2024-04-07

## 2024-04-08 NOTE — TELEPHONE ENCOUNTER
"Reason for Disposition  • Mild athlete's foot    Answer Assessment - Initial Assessment Questions  1. APPEARANCE of RASH: \"What does the rash look like?\"       Red, itching and skin peeling     2. LOCATION: \"Which part of the foot is involved?\" \"Are both feet involved?\"       Left foot in between 4th and 5th toes     3. SIZE: \"How large is the infected area?\" (Inches or centimeters)     Just in between two toes     4. ONSET: \"When did the rash start?\"      Noticed today     5. OTHER SYMPTOMS: \"Do you have any other symptoms?\" (e.g., fever)      Denies    6. PREGNANCY: \"Is there any chance you are pregnant?\" \"When was your last menstrual period?\"      Yes, 18 weeks 3 days    Protocols used: Athlete's Foot-ADULT-    "

## 2024-04-08 NOTE — TELEPHONE ENCOUNTER
"Regarding: athletes foot  ----- Message from Jonelle Torres sent at 4/7/2024  8:32 PM EDT -----  \"I have been having an itchy foot for awhile now, and I just checked in between my toes and I think I have athletes foot. Im 18 weeks pregnant and I was just wondering if I can take a certain cream.\"    "

## 2024-04-09 LAB
# FETUSES US: 1
AFP ADJ MOM SERPL: 1.03
AFP INTERP SERPL-IMP: NORMAL
AFP SERPL-MCNC: 43.4 NG/ML
AGE: NORMAL
DONATED EGG PATIENT QL: NO
GA CLIN EST: 18.1 WEEKS
GA METHOD: NORMAL
HX OF NTD NARR: NO
HX OF TRISOMY 21 NARR: NO
IDDM PATIENT QL: NO
NEURAL TUBE DEFECT RISK FETUS: NORMAL %
SL AMB REPEAT SPECIMEN: NO

## 2024-04-22 ENCOUNTER — ROUTINE PRENATAL (OUTPATIENT)
Dept: OBGYN CLINIC | Facility: CLINIC | Age: 29
End: 2024-04-22
Payer: COMMERCIAL

## 2024-04-22 VITALS
HEIGHT: 62 IN | DIASTOLIC BLOOD PRESSURE: 64 MMHG | SYSTOLIC BLOOD PRESSURE: 120 MMHG | WEIGHT: 173 LBS | BODY MASS INDEX: 31.83 KG/M2

## 2024-04-22 DIAGNOSIS — Z34.02 ENCOUNTER FOR SUPERVISION OF NORMAL FIRST PREGNANCY IN SECOND TRIMESTER: ICD-10-CM

## 2024-04-22 DIAGNOSIS — Z3A.20 20 WEEKS GESTATION OF PREGNANCY: Primary | ICD-10-CM

## 2024-04-22 LAB
SL AMB  POCT GLUCOSE, UA: NORMAL
SL AMB POCT URINE PROTEIN: NORMAL

## 2024-04-22 PROCEDURE — PNV: Performed by: OBSTETRICS & GYNECOLOGY

## 2024-04-22 PROCEDURE — 81002 URINALYSIS NONAUTO W/O SCOPE: CPT | Performed by: OBSTETRICS & GYNECOLOGY

## 2024-04-22 NOTE — PROGRESS NOTES
"Routine Prenatal Visit  [facility] - [address], [address], [place] [address]    Assessment/Plan:  Eboni is a 28 y.o. year old  at 20w4d who presents for routine prenatal visit.     1. 20 weeks gestation of pregnancy  -     POCT urine dip    2. Encounter for supervision of normal first pregnancy in second trimester          Subjective:     CC: Prenatal care    Eboni Hilton is a 28 y.o.  female who presents for routine prenatal care at 20w4d.  Pregnancy ROS: no  leakage of fluid, pelvic pain, or vaginal bleeding.  +  fetal movement.    The following portions of the patient's history were reviewed and updated as appropriate: allergies, current medications, past family history, past medical history, obstetric history, gynecologic history, past social history, past surgical history and problem list.      Objective:  /64 (BP Location: Left arm, Patient Position: Sitting, Cuff Size: Standard)   Ht 5' 2\" (1.575 m)   Wt 78.5 kg (173 lb)   LMP 2023 (Exact Date)   BMI 31.64 kg/m²   Pregravid Weight/BMI: 72.6 kg (160 lb) (BMI 29.26)  Current Weight: 78.5 kg (173 lb)   Total Weight Gain: 5.897 kg (13 lb)   Pre- Vitals    Flowsheet Row Most Recent Value   Prenatal Assessment    Fetal Heart Rate 155   Fundal Height (cm) 21 cm   Movement Present   Prenatal Vitals    Blood Pressure 120/64   Weight - Scale 78.5 kg (173 lb)   Urine Albumin/Glucose    Dilation/Effacement/Station    Vaginal Drainage    Draining Fluid No   Edema    LLE Edema None   RLE Edema None   Facial Edema None           General: Well appearing, no distress  Respiratory: Unlabored breathing  Cardiovascular: Regular rate.  Abdomen: Soft, gravid, nontender  Fundal Height: Appropriate for gestational age.  Extremities: Warm and well perfused.  Non tender.  "

## 2024-04-24 ENCOUNTER — ROUTINE PRENATAL (OUTPATIENT)
Dept: PERINATAL CARE | Facility: OTHER | Age: 29
End: 2024-04-24
Payer: COMMERCIAL

## 2024-04-24 VITALS
SYSTOLIC BLOOD PRESSURE: 128 MMHG | BODY MASS INDEX: 32.35 KG/M2 | HEIGHT: 62 IN | DIASTOLIC BLOOD PRESSURE: 60 MMHG | WEIGHT: 175.8 LBS | HEART RATE: 81 BPM

## 2024-04-24 DIAGNOSIS — Z3A.20 20 WEEKS GESTATION OF PREGNANCY: ICD-10-CM

## 2024-04-24 DIAGNOSIS — Z36.86 ENCOUNTER FOR ANTENATAL SCREENING FOR CERVICAL LENGTH: ICD-10-CM

## 2024-04-24 DIAGNOSIS — O99.210 OBESITY AFFECTING PREGNANCY, ANTEPARTUM, UNSPECIFIED OBESITY TYPE: Primary | ICD-10-CM

## 2024-04-24 PROCEDURE — 76811 OB US DETAILED SNGL FETUS: CPT | Performed by: OBSTETRICS & GYNECOLOGY

## 2024-04-24 PROCEDURE — 76817 TRANSVAGINAL US OBSTETRIC: CPT | Performed by: OBSTETRICS & GYNECOLOGY

## 2024-04-24 PROCEDURE — 99213 OFFICE O/P EST LOW 20 MIN: CPT | Performed by: OBSTETRICS & GYNECOLOGY

## 2024-04-24 NOTE — PROGRESS NOTES
The patient was seen today for an ultrasound.  Please see ultrasound report (located under Ob Procedures) for additional details.   Thank you very much for allowing us to participate in the care of this very nice patient.  Should you have any questions, please do not hesitate to contact me.     Michael Peters MD FACOG  Attending Physician, Maternal-Fetal Medicine  [facility]

## 2024-05-14 ENCOUNTER — CLINICAL SUPPORT (OUTPATIENT)
Age: 29
End: 2024-05-14

## 2024-05-14 DIAGNOSIS — Z32.2 ENCOUNTER FOR CHILDBIRTH INSTRUCTION: Primary | ICD-10-CM

## 2024-05-23 ENCOUNTER — CLINICAL SUPPORT (OUTPATIENT)
Age: 29
End: 2024-05-23

## 2024-05-23 ENCOUNTER — ROUTINE PRENATAL (OUTPATIENT)
Dept: OBGYN CLINIC | Facility: CLINIC | Age: 29
End: 2024-05-23
Payer: COMMERCIAL

## 2024-05-23 VITALS
SYSTOLIC BLOOD PRESSURE: 126 MMHG | DIASTOLIC BLOOD PRESSURE: 60 MMHG | HEIGHT: 62 IN | BODY MASS INDEX: 33.34 KG/M2 | WEIGHT: 181.2 LBS

## 2024-05-23 DIAGNOSIS — Z32.2 ENCOUNTER FOR CHILDBIRTH INSTRUCTION: Primary | ICD-10-CM

## 2024-05-23 DIAGNOSIS — Z3A.25 25 WEEKS GESTATION OF PREGNANCY: ICD-10-CM

## 2024-05-23 DIAGNOSIS — Z34.01 ENCOUNTER FOR SUPERVISION OF NORMAL FIRST PREGNANCY IN FIRST TRIMESTER: Primary | ICD-10-CM

## 2024-05-23 DIAGNOSIS — Z36.89 ENCOUNTER FOR OTHER SPECIFIED ANTENATAL SCREENING: ICD-10-CM

## 2024-05-23 PROBLEM — Z3A.24 24 WEEKS GESTATION OF PREGNANCY: Status: ACTIVE | Noted: 2024-01-30

## 2024-05-23 LAB
SL AMB  POCT GLUCOSE, UA: NORMAL
SL AMB POCT URINE PROTEIN: NORMAL

## 2024-05-23 PROCEDURE — 81002 URINALYSIS NONAUTO W/O SCOPE: CPT | Performed by: PHYSICIAN ASSISTANT

## 2024-05-23 PROCEDURE — PNV: Performed by: PHYSICIAN ASSISTANT

## 2024-05-23 NOTE — PROGRESS NOTES
"Routine Prenatal Visit  [facility] [address], [address], [place] [address]    Assessment/Plan:  Eboni is a 28 y.o. year old  at 25w0d who presents for routine prenatal visit.     1. Encounter for supervision of normal first pregnancy in first trimester  2. 25 weeks gestation of pregnancy  Assessment & Plan:  28 week packet given per patient request. Reviewed fetal kick counts, breast pump, forms to bring back, pediatricians, and perineal massage.   Script for 28 week labs given including 1hr GTT, CBC, and RPR.   Return to office for ob check in 3 weeks.   Orders:  -     POCT urine dip  3. Encounter for other specified  screening  -     Glucose, 1H PG; Future  -     CBC; Future  -     RPR (MONITOR) W/REFL TITER (REFL); Future  -     Glucose, 1H PG  -     CBC  -     RPR (MONITOR) W/REFL TITER (REFL)      Next OB Visit 3 weeks.    Subjective:     CC: Prenatal care    Eboni Hilton is a 28 y.o.  female who presents for routine prenatal care at 25w0d.  Pregnancy ROS: Good FM, No N/V, HA, cramping, VB, LOF, edema, domestic violence, or smoking. Tolerating PNV.        The following portions of the patient's history were reviewed and updated as appropriate: allergies, current medications, past family history, past medical history, obstetric history, gynecologic history, past social history, past surgical history and problem list.      Objective:  /60 (BP Location: Left arm, Patient Position: Sitting, Cuff Size: Standard)   Ht 5' 2\" (1.575 m)   Wt 82.2 kg (181 lb 3.2 oz)   LMP 2023 (Exact Date)   BMI 33.14 kg/m²   Pregravid Weight/BMI: 72.6 kg (160 lb) (BMI 29.26)  Current Weight: 82.2 kg (181 lb 3.2 oz)   Total Weight Gain: 9.616 kg (21 lb 3.2 oz)   Pre- Vitals      Flowsheet Row Most Recent Value   Prenatal Assessment    Fetal Heart Rate 154   Fundal Height (cm) 25 cm   Movement Present   Prenatal Vitals    Blood Pressure 126/60   Weight - Scale 82.2 " kg (181 lb 3.2 oz)   Urine Albumin/Glucose    Dilation/Effacement/Station    Vaginal Drainage    Edema    LLE Edema None   RLE Edema None   Facial Edema None             General: Well appearing, no distress  Abdomen: Soft, gravid, nontender  Fundal Height: Appropriate for gestational age.  Extremities: Warm and well perfused.  Non tender.

## 2024-05-23 NOTE — ASSESSMENT & PLAN NOTE
28 week packet given per patient request. Reviewed fetal kick counts, breast pump, forms to bring back, pediatricians, and perineal massage.   Script for 28 week labs given including 1hr GTT, CBC, and RPR.   Return to office for ob check in 3 weeks.

## 2024-05-30 ENCOUNTER — NURSE TRIAGE (OUTPATIENT)
Dept: OTHER | Facility: OTHER | Age: 29
End: 2024-05-30

## 2024-05-31 NOTE — TELEPHONE ENCOUNTER
"Reason for Disposition  • [1] Mild contractions AND [2] > 10 minutes apart    Answer Assessment - Initial Assessment Questions  1. ONSET: \"When did the symptoms begin?\"        This afternoon     2. CONTRACTIONS: \"Describe the contractions that you are having.\" (e.g., duration, frequency, regularity, severity)      3-4 episodes today, unsure of how long they lasted, not painful, more tightening of the abdomen    3. KATIE: \"What date are you expecting to deliver?\"      24    4. PARITY: \"Have you had a baby before?\" If Yes, ask: \"How long did the labor last?\"      Denies    5. FETAL MOVEMENT: \"Has the baby's movement decreased or changed significantly from normal?\"      Denies    6. OTHER SYMPTOMS: \"Do you have any other symptoms?\" (e.g., leaking fluid from vagina, fever, hand/facial swelling)      Tightness of abdomen, white discharge     Patient is on her feet a lot at work and hasn't really drank that much water the past two days because she was busy at work and she also took about a mile walk today.    Protocols used: Pregnancy - Labor - -ADULT-AH    "

## 2024-05-31 NOTE — TELEPHONE ENCOUNTER
"Regarding: Unsure if David Mijares or contractions?  ----- Message from Bernadette SMITH sent at 5/30/2024  8:23 PM EDT -----  \"I am 26 wks.pregnant and I think I started with [place] Mijares last evening, but I am not sure. I just had them again after leaving work and want to talk to someone.\"    "

## 2024-06-12 ENCOUNTER — ROUTINE PRENATAL (OUTPATIENT)
Dept: OBGYN CLINIC | Facility: CLINIC | Age: 29
End: 2024-06-12
Payer: COMMERCIAL

## 2024-06-12 VITALS
DIASTOLIC BLOOD PRESSURE: 68 MMHG | SYSTOLIC BLOOD PRESSURE: 126 MMHG | WEIGHT: 183 LBS | BODY MASS INDEX: 33.68 KG/M2 | HEIGHT: 62 IN

## 2024-06-12 DIAGNOSIS — R51.9 HEADACHE IN PREGNANCY, ANTEPARTUM, SECOND TRIMESTER: Primary | ICD-10-CM

## 2024-06-12 DIAGNOSIS — O26.892 HEADACHE IN PREGNANCY, ANTEPARTUM, SECOND TRIMESTER: Primary | ICD-10-CM

## 2024-06-12 DIAGNOSIS — Z3A.27 27 WEEKS GESTATION OF PREGNANCY: ICD-10-CM

## 2024-06-12 PROBLEM — Z34.02 ENCOUNTER FOR SUPERVISION OF NORMAL FIRST PREGNANCY IN SECOND TRIMESTER: Status: ACTIVE | Noted: 2024-01-30

## 2024-06-12 PROBLEM — O26.899 HEADACHE IN PREGNANCY, ANTEPARTUM: Status: ACTIVE | Noted: 2024-06-12

## 2024-06-12 LAB
SL AMB  POCT GLUCOSE, UA: NORMAL
SL AMB POCT URINE PROTEIN: NORMAL

## 2024-06-12 PROCEDURE — 81002 URINALYSIS NONAUTO W/O SCOPE: CPT | Performed by: STUDENT IN AN ORGANIZED HEALTH CARE EDUCATION/TRAINING PROGRAM

## 2024-06-12 PROCEDURE — PNV: Performed by: STUDENT IN AN ORGANIZED HEALTH CARE EDUCATION/TRAINING PROGRAM

## 2024-06-12 RX ORDER — CALCIUM CARBONATE/VITAMIN D3 500-10/5ML
400 LIQUID (ML) ORAL DAILY
Qty: 90 CAPSULE | Refills: 1 | Status: SHIPPED | OUTPATIENT
Start: 2024-06-12

## 2024-06-12 NOTE — ASSESSMENT & PLAN NOTE
- PTL/PPROM/Bleeding precautions given.   - MFM consult report from level II ultrasound reviewed. Repeat US is scheduled in 3rd trimester, per [facility] recommendations.  - 28 week labs previously ordered. She plans to go to the lab on Saturday. Patient is Rh positive.  - Problem list updated, results console reviewed and updated with pertinent prenatal labs.  - PMH, PSH, medications reviewed and updated as needed.  - Return to office in 2 wk(s) for routine prenatal care

## 2024-06-12 NOTE — PROGRESS NOTES
"Routine Prenatal Visit  [facility] - [address], [address], [place] [address]    Assessment/Plan:  Eboni is a 28 y.o. year old  at 27w6d who presents for routine prenatal visit.     1. Headache in pregnancy, antepartum, second trimester  Assessment & Plan:  - Recommend initiation of magnesium oxide 400 mg PO daily and riboflavin 400 mg PO daily for headache prevention.   - Continue hydration and OTC acetaminophen PRN.   Orders:  -     Magnesium Oxide 400 MG CAPS; Take 1 tablet (400 mg total) by mouth daily  -     Riboflavin 400 MG CAPS; Take 1 capsule (400 mg total) by mouth daily  2. 27 weeks gestation of pregnancy  Assessment & Plan:  - PTL/PPROM/Bleeding precautions given.   - MFM consult report from level II ultrasound reviewed. Repeat US is scheduled in 3rd trimester, per MFM recommendations.  - 28 week labs previously ordered. She plans to go to the lab on Saturday. Patient is Rh positive.  - Problem list updated, results console reviewed and updated with pertinent prenatal labs.  - PMH, PSH, medications reviewed and updated as needed.  - Return to office in 2 wk(s) for routine prenatal care    Orders:  -     POCT urine dip        Subjective:   Eboni Hilton is a 28 y.o.  who presents for routine prenatal care at 27w6d.  Complaints today: No  LOF: No; VB: No; Contractions: No; FM: Present    Objective:  /68 (BP Location: Left arm, Patient Position: Sitting, Cuff Size: Standard)   Ht 5' 2\" (1.575 m)   Wt 83 kg (183 lb)   LMP 2023 (Exact Date)   BMI 33.47 kg/m²     General: Well appearing, no distress  Respiratory: Unlabored breathing  Cardiovascular: Regular rate.  Abdomen: Soft, gravid, nontender  Extremities: Warm and well perfused.  Non tender.    Pregravid Weight/BMI: 72.6 kg (160 lb) (BMI 29.26)  Current Weight: 83 kg (183 lb)   Total Weight Gain: 10.4 kg (23 lb)     Pre-Jordin Vitals      Flowsheet Row Most Recent Value   Prenatal Assessment    Fetal " Heart Rate 145   Fundal Height (cm) 27 cm   Movement Present   Prenatal Vitals    Blood Pressure 126/68   Weight - Scale 83 kg (183 lb)   Urine Albumin/Glucose    Dilation/Effacement/Station    Vaginal Drainage    Draining Fluid No   Edema    LLE Edema None   RLE Edema None   Facial Edema None             Barrie Shay MD  6/12/2024 5:49 PM

## 2024-06-12 NOTE — ASSESSMENT & PLAN NOTE
- Recommend initiation of magnesium oxide 400 mg PO daily and riboflavin 400 mg PO daily for headache prevention.   - Continue hydration and OTC acetaminophen PRN.

## 2024-06-15 LAB
EXTERNAL HEMOGLOBIN: 12.6 G/DL
EXTERNAL PLATELET COUNT: 238 K/ÂΜL
EXTERNAL SYPHILIS TOTAL IGG/IGM SCREENING: NORMAL

## 2024-06-16 LAB
ERYTHROCYTE [DISTWIDTH] IN BLOOD BY AUTOMATED COUNT: 12 % (ref 11–15)
GLUCOSE 1H P 50 G GLC PO SERPL-MCNC: 101 MG/DL
HCT VFR BLD AUTO: 36.4 % (ref 35–45)
HGB BLD-MCNC: 12.6 G/DL (ref 11.7–15.5)
MCH RBC QN AUTO: 31 PG (ref 27–33)
MCHC RBC AUTO-ENTMCNC: 34.6 G/DL (ref 32–36)
MCV RBC AUTO: 89.4 FL (ref 80–100)
PLATELET # BLD AUTO: 238 THOUSAND/UL (ref 140–400)
PMV BLD REES-ECKER: 10.7 FL (ref 7.5–12.5)
RBC # BLD AUTO: 4.07 MILLION/UL (ref 3.8–5.1)
RPR SER QL: NORMAL
WBC # BLD AUTO: 10.2 THOUSAND/UL (ref 3.8–10.8)

## 2024-06-19 ENCOUNTER — OFFICE VISIT (OUTPATIENT)
Dept: FAMILY MEDICINE CLINIC | Facility: HOSPITAL | Age: 29
End: 2024-06-19
Payer: COMMERCIAL

## 2024-06-19 VITALS
HEIGHT: 62 IN | BODY MASS INDEX: 34.04 KG/M2 | SYSTOLIC BLOOD PRESSURE: 118 MMHG | OXYGEN SATURATION: 98 % | DIASTOLIC BLOOD PRESSURE: 62 MMHG | HEART RATE: 101 BPM | WEIGHT: 185 LBS

## 2024-06-19 DIAGNOSIS — Z00.00 ANNUAL PHYSICAL EXAM: Primary | ICD-10-CM

## 2024-06-19 PROBLEM — R27.0 ATAXIA: Status: RESOLVED | Noted: 2023-10-17 | Resolved: 2024-06-19

## 2024-06-19 PROCEDURE — 99385 PREV VISIT NEW AGE 18-39: CPT | Performed by: INTERNAL MEDICINE

## 2024-06-19 NOTE — PROGRESS NOTES
"Adult Annual Physical  Name: Eboni Hilton      : 1995      MRN: [id number]  Encounter Provider: Yandy Hall MD  Encounter Date: 2024   Encounter department: [facility] [address]    Assessment & Plan   1. Annual physical exam  Immunizations and preventive care screenings were discussed with patient today. Appropriate education was printed on patient's after visit summary.    Counseling:  Exercise: the importance of regular exercise/physical activity was discussed. Recommend exercise 3-5 times per week for at least 30 minutes.          History of Present Illness     Adult Annual Physical:  Patient presents for annual physical.     Diet and Physical Activity:  - Diet/Nutrition: well balanced diet.  - Exercise: walking.    Depression Screening:  - PHQ-2 Score: 0    General Health:  - Sleep: sleeps well.  - Hearing: normal hearing bilateral ears.  - Vision: wears glasses.  - Dental: regular dental visits.    /GYN Health:  - Follows with GYN: yes.     Review of Systems   Constitutional:  Negative for fever.   HENT:  Negative for hearing loss.    Eyes:  Negative for visual disturbance.   Respiratory:  Negative for cough and shortness of breath.    Cardiovascular:  Negative for chest pain and palpitations.   Gastrointestinal:  Negative for abdominal pain, blood in stool and nausea.   Endocrine: Negative for polydipsia and polyphagia.   Genitourinary:  Negative for dysuria and hematuria.   Musculoskeletal:  Negative for myalgias.   Skin:  Negative for rash.   Neurological:  Positive for headaches (headaches are much improved after hydration, magnesium). Negative for weakness.   Psychiatric/Behavioral:  Negative for dysphoric mood.    All other systems reviewed and are negative.    Medical History Reviewed by provider this encounter:  Tobacco  Allergies  Meds  Problems  Med Hx  Surg Hx  Fam Hx         Objective     /62   Pulse 101   Ht 5' 2\" (1.575 m)   Wt 83.9 kg " (185 lb)   LMP 11/30/2023 (Exact Date)   SpO2 98%   BMI 33.84 kg/m²     Physical Exam  Constitutional:       General: She is not in acute distress.     Appearance: She is well-developed. She is not toxic-appearing.   HENT:      Head: Normocephalic.      Right Ear: Tympanic membrane normal. There is no impacted cerumen.      Left Ear: Tympanic membrane normal. There is no impacted cerumen.      Mouth/Throat:      Mouth: Mucous membranes are moist.      Pharynx: No oropharyngeal exudate.   Eyes:      Conjunctiva/sclera: Conjunctivae normal.   Cardiovascular:      Rate and Rhythm: Normal rate and regular rhythm.      Heart sounds: No murmur heard.     No gallop.   Pulmonary:      Effort: Pulmonary effort is normal. No respiratory distress.      Breath sounds: No wheezing or rales.   Musculoskeletal:      Cervical back: Neck supple.   Skin:     General: Skin is warm and dry.      Findings: No rash.   Neurological:      Mental Status: She is alert and oriented to person, place, and time.      Cranial Nerves: No cranial nerve deficit.      Motor: No weakness.      Gait: Gait normal.   Psychiatric:         Mood and Affect: Mood normal.         Thought Content: Thought content normal.       Administrative Statements

## 2024-06-22 ENCOUNTER — CLINICAL SUPPORT (OUTPATIENT)
Age: 29
End: 2024-06-22

## 2024-06-22 DIAGNOSIS — Z32.2 ENCOUNTER FOR CHILDBIRTH INSTRUCTION: Primary | ICD-10-CM

## 2024-06-24 ENCOUNTER — CLINICAL SUPPORT (OUTPATIENT)
Age: 29
End: 2024-06-24

## 2024-06-24 DIAGNOSIS — Z32.2 ENCOUNTER FOR CHILDBIRTH INSTRUCTION: Primary | ICD-10-CM

## 2024-06-26 ENCOUNTER — ROUTINE PRENATAL (OUTPATIENT)
Dept: OBGYN CLINIC | Facility: CLINIC | Age: 29
End: 2024-06-26
Payer: COMMERCIAL

## 2024-06-26 VITALS
BODY MASS INDEX: 34.34 KG/M2 | SYSTOLIC BLOOD PRESSURE: 120 MMHG | HEIGHT: 62 IN | DIASTOLIC BLOOD PRESSURE: 62 MMHG | WEIGHT: 186.6 LBS

## 2024-06-26 DIAGNOSIS — Z23 ENCOUNTER FOR IMMUNIZATION: ICD-10-CM

## 2024-06-26 DIAGNOSIS — R51.9 HEADACHE IN PREGNANCY, ANTEPARTUM: Primary | ICD-10-CM

## 2024-06-26 DIAGNOSIS — Z3A.29 29 WEEKS GESTATION OF PREGNANCY: ICD-10-CM

## 2024-06-26 DIAGNOSIS — O26.899 HEADACHE IN PREGNANCY, ANTEPARTUM: Primary | ICD-10-CM

## 2024-06-26 LAB
SL AMB  POCT GLUCOSE, UA: NORMAL
SL AMB POCT URINE PROTEIN: NORMAL

## 2024-06-26 PROCEDURE — 90471 IMMUNIZATION ADMIN: CPT | Performed by: OBSTETRICS & GYNECOLOGY

## 2024-06-26 PROCEDURE — PNV: Performed by: OBSTETRICS & GYNECOLOGY

## 2024-06-26 PROCEDURE — 90715 TDAP VACCINE 7 YRS/> IM: CPT | Performed by: OBSTETRICS & GYNECOLOGY

## 2024-06-26 PROCEDURE — 81002 URINALYSIS NONAUTO W/O SCOPE: CPT | Performed by: OBSTETRICS & GYNECOLOGY

## 2024-06-26 NOTE — PROGRESS NOTES
Tdap given IM into Right Deltoid. No redness, swelling, to injection site. No anaphylactic reaction noted. Pt. states had received Tetanus vaccine in the past from an Urgent Care facility and noticed some localized redness to area. She never had any anaphylactic reaction to Tetanus vaccine in the past. Dr. Lopez aware of past Tetanus reaction and verbally consented pt may receive Tdap vaccine today.

## 2024-06-26 NOTE — PROGRESS NOTES
"Routine Prenatal Visit  [facility] [address], [address], [place] [address]    Assessment/Plan:  Eboni is a 28 y.o. year old  at 29w6d who presents for routine prenatal visit.     1. Headache in pregnancy, antepartum  Assessment & Plan:  Headaches improved  2. 29 weeks gestation of pregnancy  Assessment & Plan:  Reviewed normal 28 week labs  Orders:  -     POCT urine dip  3. Encounter for immunization  -     Tdap Vaccine greater than or equal to 8yo        Subjective:     CC: Prenatal care    Eboni Hilton is a 28 y.o.  female who presents for routine prenatal care at 29w6d.  Pregnancy ROS: no leakage of fluid, pelvic pain, or vaginal bleeding.  normal fetal movement.    The following portions of the patient's history were reviewed and updated as appropriate: allergies, current medications, past family history, past medical history, obstetric history, gynecologic history, past social history, past surgical history and problem list.      Objective:  /62 (BP Location: Left arm, Patient Position: Sitting, Cuff Size: Large)   Ht 5' 2\" (1.575 m)   Wt 84.6 kg (186 lb 9.6 oz)   LMP 2023 (Exact Date)   BMI 34.13 kg/m²   Pregravid Weight/BMI: 72.6 kg (160 lb) (BMI 29.26)  Current Weight: 84.6 kg (186 lb 9.6 oz)   Total Weight Gain: 12.1 kg (26 lb 9.6 oz)   Pre-Jordin Vitals      Flowsheet Row Most Recent Value   Prenatal Assessment    Fetal Heart Rate 140   Fundal Height (cm) 30 cm   Movement Present   Presentation Vertex   Prenatal Vitals    Blood Pressure 120/62   Weight - Scale 84.6 kg (186 lb 9.6 oz)   Urine Albumin/Glucose    Dilation/Effacement/Station    Vaginal Drainage    Edema              General: Well appearing, no distress  Respiratory: Unlabored breathing  Cardiovascular: Regular rate.  Abdomen: Soft, gravid, nontender  Fundal Height: Appropriate for gestational age.  Extremities: Warm and well perfused.  Non tender.  "

## 2024-06-28 PROBLEM — Z3A.29 29 WEEKS GESTATION OF PREGNANCY: Status: ACTIVE | Noted: 2024-01-30

## 2024-06-28 LAB
DME PARACHUTE DELIVERY DATE REQUESTED: NORMAL
DME PARACHUTE DELIVERY NOTE: NORMAL
DME PARACHUTE ITEM DESCRIPTION: NORMAL
DME PARACHUTE ORDER STATUS: NORMAL
DME PARACHUTE SUPPLIER NAME: NORMAL
DME PARACHUTE SUPPLIER PHONE: NORMAL

## 2024-07-09 PROBLEM — Z3A.31 31 WEEKS GESTATION OF PREGNANCY: Status: ACTIVE | Noted: 2024-01-30

## 2024-07-09 LAB
DME PARACHUTE DELIVERY DATE ACTUAL: NORMAL
DME PARACHUTE DELIVERY DATE REQUESTED: NORMAL
DME PARACHUTE DELIVERY NOTE: NORMAL
DME PARACHUTE ITEM DESCRIPTION: NORMAL
DME PARACHUTE ORDER STATUS: NORMAL
DME PARACHUTE SUPPLIER NAME: NORMAL
DME PARACHUTE SUPPLIER PHONE: NORMAL

## 2024-07-10 ENCOUNTER — ROUTINE PRENATAL (OUTPATIENT)
Dept: OBGYN CLINIC | Facility: CLINIC | Age: 29
End: 2024-07-10
Payer: COMMERCIAL

## 2024-07-10 VITALS
WEIGHT: 189.6 LBS | SYSTOLIC BLOOD PRESSURE: 118 MMHG | HEIGHT: 62 IN | DIASTOLIC BLOOD PRESSURE: 62 MMHG | BODY MASS INDEX: 34.89 KG/M2

## 2024-07-10 DIAGNOSIS — R51.9 HEADACHE IN PREGNANCY, ANTEPARTUM: Primary | ICD-10-CM

## 2024-07-10 DIAGNOSIS — Z3A.31 31 WEEKS GESTATION OF PREGNANCY: ICD-10-CM

## 2024-07-10 DIAGNOSIS — O26.899 HEADACHE IN PREGNANCY, ANTEPARTUM: Primary | ICD-10-CM

## 2024-07-10 PROBLEM — Z34.03 ENCOUNTER FOR SUPERVISION OF NORMAL FIRST PREGNANCY IN THIRD TRIMESTER: Status: ACTIVE | Noted: 2024-01-30

## 2024-07-10 LAB
SL AMB  POCT GLUCOSE, UA: NORMAL
SL AMB POCT URINE PROTEIN: NORMAL

## 2024-07-10 PROCEDURE — PNV: Performed by: OBSTETRICS & GYNECOLOGY

## 2024-07-10 PROCEDURE — 81002 URINALYSIS NONAUTO W/O SCOPE: CPT | Performed by: OBSTETRICS & GYNECOLOGY

## 2024-07-10 NOTE — PROGRESS NOTES
"Routine Prenatal Visit  [facility] - [address], [address], [place] [address]    Assessment/Plan:  Eboni is a 29 y.o. year old  at 31w6d who presents for routine prenatal visit.     1. Headache in pregnancy, antepartum  Assessment & Plan:  No real headaches since taking magnesium and ribiflavin.  Continue.  2. 31 weeks gestation of pregnancy  Assessment & Plan:  Discussed adding Pepcid for heartburn.  Growth and missed anatomy u/s next week with MFM 2024.  Orders:  -     POCT urine dip      Next OB Visit 2 weeks.    Subjective:     CC: Prenatal care    Eboni Hilton is a 29 y.o.  female who presents for routine prenatal care at 31w6d.  Pregnancy ROS: no leakage of fluid, pelvic pain, or vaginal bleeding.  normal fetal movement.    The following portions of the patient's history were reviewed and updated as appropriate: allergies, current medications, past family history, past medical history, obstetric history, gynecologic history, past social history, past surgical history and problem list.      Objective:  /62 (BP Location: Left arm, Patient Position: Sitting, Cuff Size: Standard)   Ht 5' 2\" (1.575 m)   Wt 86 kg (189 lb 9.6 oz)   LMP 2023 (Exact Date)   BMI 34.68 kg/m²   Pregravid Weight/BMI: 72.6 kg (160 lb) (BMI 29.26)  Current Weight: 86 kg (189 lb 9.6 oz)   Total Weight Gain: 13.4 kg (29 lb 9.6 oz)   Pre- Vitals      Flowsheet Row Most Recent Value   Prenatal Assessment    Fetal Heart Rate 130   Fundal Height (cm) 31 cm   Movement Present   Prenatal Vitals    Blood Pressure 118/62   Weight - Scale 86 kg (189 lb 9.6 oz)   Urine Albumin/Glucose    Dilation/Effacement/Station    Vaginal Drainage    Edema    LLE Edema None   RLE Edema None             General: Well appearing, no distress  Abdomen: Soft, gravid, nontender  Extremities: Non tender.  "

## 2024-07-10 NOTE — PATIENT INSTRUCTIONS
- Continue prenatal vitamins and all prescribed medications.    - Try to drink 64 oz of water or other non-caffeinated fluids daily.   - Fetal kick counts (to be done daily or if note a decrease in fetal movement):  in a quiet place, after a meal or drinking something sweet, lie on your side and count movements.  Should get 10 movements in 2 hours.  Call office if less than this.  - Call office if leaking of fluid, bleeding, contractions >5-6/hour which don't decrease with rest, oral hydration, or emptying bladder, or decreased fetal movement.       Heartburn   - pepcid 20mg once or twice a day   - continue tums in addition

## 2024-07-10 NOTE — ASSESSMENT & PLAN NOTE
Discussed adding Pepcid for heartburn.  Growth and missed anatomy u/s next week with MFM 7/17/2024.

## 2024-07-17 ENCOUNTER — ULTRASOUND (OUTPATIENT)
Dept: PERINATAL CARE | Facility: OTHER | Age: 29
End: 2024-07-17
Payer: COMMERCIAL

## 2024-07-17 VITALS
DIASTOLIC BLOOD PRESSURE: 78 MMHG | HEIGHT: 62 IN | HEART RATE: 75 BPM | SYSTOLIC BLOOD PRESSURE: 132 MMHG | BODY MASS INDEX: 35.48 KG/M2 | WEIGHT: 192.8 LBS

## 2024-07-17 DIAGNOSIS — Z36.3 ENCOUNTER FOR ANTENATAL SCREENING FOR MALFORMATION: Primary | ICD-10-CM

## 2024-07-17 DIAGNOSIS — Z36.2 ENCOUNTER FOR FOLLOW-UP ULTRASOUND OF FETAL ANATOMY: ICD-10-CM

## 2024-07-17 DIAGNOSIS — Z3A.32 32 WEEKS GESTATION OF PREGNANCY: ICD-10-CM

## 2024-07-17 PROCEDURE — 76816 OB US FOLLOW-UP PER FETUS: CPT | Performed by: OBSTETRICS & GYNECOLOGY

## 2024-07-17 RX ORDER — FAMOTIDINE 20 MG/1
TABLET, FILM COATED ORAL
COMMUNITY
Start: 2024-07-10

## 2024-07-17 NOTE — LETTER
July 17, 2024     Raghav Romeo V,   [address] [facility] [address]  [address]  [place] [address]    Patient: Eboni Hilton   YOB: 1995   Date of Visit: 7/17/2024       Dear Dr. Romeo:    Thank you for referring Eboni Hilton to me for evaluation. Below are my notes for this consultation.    If you have questions, please do not hesitate to call me. I look forward to following your patient along with you.         Sincerely,        Vaishali Alfonso MD        CC: No Recipients    Vaishali Alfonso MD  7/17/2024 11:09 PM  Sign when Signing Visit  Eboni Hilton has no complaints today.  She reports regular fetal movements and does not report any problems.  She is here today at 32w6d for an ultrasound for fetal growth and missed anatomy.  Weight gain 32 pounds.    Ultrasound findings:  The ultrasound today shows normal interval fetal growth and fluid.  The prior missed anatomy was reviewed and appears normal.  This completes the anatomical survey.  Fetal movement and breathing are seen.  Incidental BPP is noted of 8 out of 8 which is normal.    Pregnancy ultrasound has limitations and is unable to detect all forms of fetal congenital abnormalities.  The inaccuracy in the EFW can be off by 1 lb either way in the third trimester.    Follow up recommended:   No further follow-up ultrasounds are recommended at this time.    Pre visit time reviewing her records   5 minutes  Face to face time 5 minutes  Post visit time on documentation of note, updating her problem list, adding orders and prescriptions 5 minutes.  Procedures that were completed today were charged separately.   The level of decision making was straight forward.    Vaishali Alfonso MD

## 2024-07-17 NOTE — PROGRESS NOTES
Eboni Hilton has no complaints today.  She reports regular fetal movements and does not report any problems.  She is here today at 32w6d for an ultrasound for fetal growth and missed anatomy.  Weight gain 32 pounds.    Ultrasound findings:  The ultrasound today shows normal interval fetal growth and fluid.  The prior missed anatomy was reviewed and appears normal.  This completes the anatomical survey.  Fetal movement and breathing are seen.  Incidental BPP is noted of 8 out of 8 which is normal.    Pregnancy ultrasound has limitations and is unable to detect all forms of fetal congenital abnormalities.  The inaccuracy in the EFW can be off by 1 lb either way in the third trimester.    Follow up recommended:   No further follow-up ultrasounds are recommended at this time.    Pre visit time reviewing her records   5 minutes  Face to face time 5 minutes  Post visit time on documentation of note, updating her problem list, adding orders and prescriptions 5 minutes.  Procedures that were completed today were charged separately.   The level of decision making was straight forward.    Vaishali Alfonso MD

## 2024-07-17 NOTE — LETTER
July 17, 2024     Andrei Lopez MD  [address] [facility] [facility]  [address]  [place] [address]    Patient: Eboni Hilton   YOB: 1995   Date of Visit: 7/17/2024       Dear Dr. Lopez:    Thank you for referring Eboni Hilton to me for evaluation. Below are my notes for this consultation.    If you have questions, please do not hesitate to call me. I look forward to following your patient along with you.         Sincerely,        Vaishali Alfonso MD        CC: No Recipients    Vaishali Alfonso MD  7/17/2024  3:27 PM  Sign when Signing Visit  Eboni Hilton has no complaints today.  She reports regular fetal movements and does not report any problems.  She is here today at 32w6d for an ultrasound for fetal growth and missed anatomy.  Weight gain 32 pounds.    Ultrasound findings:  The ultrasound today shows normal interval fetal growth and fluid.  The prior missed anatomy was reviewed and appears normal.  This completes the anatomical survey.  Fetal movement and breathing are seen.  Incidental BPP is noted of 8 out of 8 which is normal.    Pregnancy ultrasound has limitations and is unable to detect all forms of fetal congenital abnormalities.  The inaccuracy in the EFW can be off by 1 lb either way in the third trimester.    Follow up recommended:   No further follow-up ultrasounds are recommended at this time.    Pre visit time reviewing her records   5 minutes  Face to face time 5 minutes  Post visit time on documentation of note, updating her problem list, adding orders and prescriptions 5 minutes.  Procedures that were completed today were charged separately.   The level of decision making was straight forward.    Vaishali Alfonso MD

## 2024-07-22 ENCOUNTER — ROUTINE PRENATAL (OUTPATIENT)
Dept: OBGYN CLINIC | Facility: CLINIC | Age: 29
End: 2024-07-22
Payer: COMMERCIAL

## 2024-07-22 VITALS
SYSTOLIC BLOOD PRESSURE: 114 MMHG | WEIGHT: 193 LBS | HEIGHT: 62 IN | BODY MASS INDEX: 35.51 KG/M2 | DIASTOLIC BLOOD PRESSURE: 66 MMHG

## 2024-07-22 DIAGNOSIS — R51.9 HEADACHE IN PREGNANCY, ANTEPARTUM: Primary | ICD-10-CM

## 2024-07-22 DIAGNOSIS — O26.899 HEADACHE IN PREGNANCY, ANTEPARTUM: Primary | ICD-10-CM

## 2024-07-22 DIAGNOSIS — Z3A.33 33 WEEKS GESTATION OF PREGNANCY: ICD-10-CM

## 2024-07-22 LAB
SL AMB  POCT GLUCOSE, UA: NORMAL
SL AMB POCT URINE PROTEIN: NORMAL

## 2024-07-22 PROCEDURE — 81002 URINALYSIS NONAUTO W/O SCOPE: CPT | Performed by: OBSTETRICS & GYNECOLOGY

## 2024-07-22 PROCEDURE — PNV: Performed by: OBSTETRICS & GYNECOLOGY

## 2024-07-22 NOTE — PROGRESS NOTES
"Routine Prenatal Visit  [facility] [address], [address], [place] [address]    Assessment/Plan:  Eboni is a 29 y.o. year old  at 33w4d who presents for routine prenatal visit.     1. Headache in pregnancy, antepartum  Assessment & Plan:  Resolved  2. 33 weeks gestation of pregnancy  Assessment & Plan:  Normal growth at 40%  Orders:  -     POCT urine dip        Subjective:   Eboni Hilton is a 29 y.o.  who presents for routine prenatal care at 33w4d.  Complaints today: Denies  LOF: -; VB: -; Contractions: -; FM: +    Objective:  /66 (BP Location: Right arm, Patient Position: Sitting, Cuff Size: Standard)   Ht 5' 2\" (1.575 m)   Wt 87.5 kg (193 lb)   LMP 2023 (Exact Date)   BMI 35.30 kg/m²     General: Well appearing, no distress  Respiratory: Unlabored breathing  Abdomen: Soft, gravid, nontender  Extremities: Warm and well perfused.  Non tender.    Pregravid Weight/BMI: 72.6 kg (160 lb) (BMI 29.26)  Current Weight: 87.5 kg (193 lb)   Total Weight Gain: 15 kg (33 lb)     Pre-Jordin Vitals      Flowsheet Row Most Recent Value   Prenatal Assessment    Fetal Heart Rate 135   Fundal Height (cm) 33 cm   Movement Present   Prenatal Vitals    Blood Pressure 114/66   Weight - Scale 87.5 kg (193 lb)   Urine Albumin/Glucose    Dilation/Effacement/Station    Vaginal Drainage    Edema              Raghav Romeo DO  2024 5:57 PM     "

## 2024-08-06 ENCOUNTER — ROUTINE PRENATAL (OUTPATIENT)
Dept: OBGYN CLINIC | Facility: CLINIC | Age: 29
End: 2024-08-06
Payer: COMMERCIAL

## 2024-08-06 VITALS
DIASTOLIC BLOOD PRESSURE: 76 MMHG | BODY MASS INDEX: 36.07 KG/M2 | WEIGHT: 196 LBS | SYSTOLIC BLOOD PRESSURE: 126 MMHG | HEIGHT: 62 IN

## 2024-08-06 DIAGNOSIS — Z3A.35 35 WEEKS GESTATION OF PREGNANCY: ICD-10-CM

## 2024-08-06 DIAGNOSIS — Z34.03 ENCOUNTER FOR SUPERVISION OF NORMAL FIRST PREGNANCY IN THIRD TRIMESTER: Primary | ICD-10-CM

## 2024-08-06 LAB
SL AMB  POCT GLUCOSE, UA: NORMAL
SL AMB POCT URINE PROTEIN: NORMAL

## 2024-08-06 PROCEDURE — PNV: Performed by: OBSTETRICS & GYNECOLOGY

## 2024-08-06 PROCEDURE — 81002 URINALYSIS NONAUTO W/O SCOPE: CPT | Performed by: OBSTETRICS & GYNECOLOGY

## 2024-08-06 NOTE — PROGRESS NOTES
"Routine Prenatal Visit  [facility] - [address], [address], [place] [address]    Assessment/Plan:  Eboni is a 29 y.o. year old  at 35w5d who presents for routine prenatal visit.     1. Encounter for supervision of normal first pregnancy in third trimester  2. 35 weeks gestation of pregnancy  Assessment & Plan:  GBS/consent next visit.  Orders:  -     POCT urine dip        Subjective:     CC: Prenatal care    Eboni Hilton is a 29 y.o.  female who presents for routine prenatal care at 35w5d.  Pregnancy ROS: no leakage of fluid, pelvic pain, or vaginal bleeding.  normal fetal movement.    The following portions of the patient's history were reviewed and updated as appropriate: allergies, current medications, past family history, past medical history, obstetric history, gynecologic history, past social history, past surgical history and problem list.      Objective:  /76 (BP Location: Right arm, Patient Position: Sitting, Cuff Size: Large)   Ht 5' 2\" (1.575 m)   Wt 88.9 kg (196 lb)   LMP 2023 (Exact Date)   BMI 35.85 kg/m²   Pregravid Weight/BMI: 72.6 kg (160 lb) (BMI 29.26)  Current Weight: 88.9 kg (196 lb)   Total Weight Gain: 16.3 kg (36 lb)   Pre- Vitals      Flowsheet Row Most Recent Value   Prenatal Assessment    Fetal Heart Rate 135   Fundal Height (cm) 36 cm   Movement Present   Presentation Vertex   Prenatal Vitals    Blood Pressure 126/76   Weight - Scale 88.9 kg (196 lb)   Urine Albumin/Glucose    Dilation/Effacement/Station    Vaginal Drainage    Edema              General: Well appearing, no distress  Respiratory: Unlabored breathing  Cardiovascular: Regular rate.  Abdomen: Soft, gravid, nontender  Fundal Height: Appropriate for gestational age.  Extremities: Warm and well perfused.  Non tender.  "

## 2024-08-13 PROBLEM — Z3A.36 36 WEEKS GESTATION OF PREGNANCY: Status: ACTIVE | Noted: 2024-01-30

## 2024-08-14 ENCOUNTER — ROUTINE PRENATAL (OUTPATIENT)
Dept: OBGYN CLINIC | Facility: CLINIC | Age: 29
End: 2024-08-14
Payer: COMMERCIAL

## 2024-08-14 VITALS
DIASTOLIC BLOOD PRESSURE: 80 MMHG | BODY MASS INDEX: 35.88 KG/M2 | WEIGHT: 195 LBS | HEIGHT: 62 IN | SYSTOLIC BLOOD PRESSURE: 120 MMHG

## 2024-08-14 DIAGNOSIS — O26.899 HEADACHE IN PREGNANCY, ANTEPARTUM: Primary | ICD-10-CM

## 2024-08-14 DIAGNOSIS — Z36.85 ENCOUNTER FOR ANTENATAL SCREENING FOR STREPTOCOCCUS B: ICD-10-CM

## 2024-08-14 DIAGNOSIS — R51.9 HEADACHE IN PREGNANCY, ANTEPARTUM: Primary | ICD-10-CM

## 2024-08-14 DIAGNOSIS — Z3A.36 36 WEEKS GESTATION OF PREGNANCY: ICD-10-CM

## 2024-08-14 LAB
SL AMB  POCT GLUCOSE, UA: NORMAL
SL AMB POCT URINE PROTEIN: NORMAL

## 2024-08-14 PROCEDURE — PNV: Performed by: STUDENT IN AN ORGANIZED HEALTH CARE EDUCATION/TRAINING PROGRAM

## 2024-08-14 PROCEDURE — 81002 URINALYSIS NONAUTO W/O SCOPE: CPT | Performed by: STUDENT IN AN ORGANIZED HEALTH CARE EDUCATION/TRAINING PROGRAM

## 2024-08-14 NOTE — PROGRESS NOTES
"Routine Prenatal Visit  [facility] - [address], [address], [place] [address]    Assessment/Plan:  Eboni is a 29 y.o. year old  at 36w6d who presents for routine prenatal visit.     1. Headache in pregnancy, antepartum  2. 36 weeks gestation of pregnancy  Assessment & Plan:  - Labor/Bleeding/ROM precautions given. Kick counts reviewed  - GBS swab collected today.  - Delivery consent reviewed and signed today. Risks of delivery reviewed, including bleeding, infection, damage to surrounding organs/structures (specifically bowel, bladder, vessels, nerves, ureters), need for operative vaginal delivery or  delivery, hysterectomy, need for blood transfusion, need for further surgery.   - Problem list updated, results console reviewed and updated with pertinent prenatal labs.  - PMH, PSH, medications reviewed and updated as needed  - Return to office in 1 wk(s) for routine prenatal care    Orders:  -     POCT urine dip  3. Encounter for  screening for Streptococcus B  -     Streptococcus Group B DNA PCR,Broth Enrich W/Rfl        Subjective:   Eboni Hilton is a 29 y.o.  who presents for routine prenatal care at 36w6d.  Complaints today: None  LOF: No; VB: No; Contractions: No; FM: Present and normal    Objective:  /80 (BP Location: Left arm, Patient Position: Sitting, Cuff Size: Standard)   Ht 5' 2\" (1.575 m)   Wt 88.5 kg (195 lb)   LMP 2023 (Exact Date)   BMI 35.67 kg/m²     General: Well appearing, no distress  Respiratory: Unlabored breathing  Cardiovascular: Regular rate.  Abdomen: Soft, gravid, nontender  Extremities: Warm and well perfused.  Non tender.    Pregravid Weight/BMI: 72.6 kg (160 lb) (BMI 29.26)  Current Weight: 88.5 kg (195 lb)   Total Weight Gain: 15.9 kg (35 lb)     Pre- Vitals      Flowsheet Row Most Recent Value   Prenatal Assessment    Fetal Heart Rate 150   Fundal Height (cm) 36 cm   Movement Present   Presentation Vertex "   Prenatal Vitals    Blood Pressure 120/80   Weight - Scale 88.5 kg (195 lb)   Urine Albumin/Glucose    Dilation/Effacement/Station    Cervical Dilation 0   Cervical Effacement 0   Fetal Station -4   Vaginal Drainage    Draining Fluid No   Edema    LLE Edema None   RLE Edema None   Facial Edema None             Barrie Shay MD  8/14/2024 6:03 PM

## 2024-08-14 NOTE — ASSESSMENT & PLAN NOTE
- Labor/Bleeding/ROM precautions given. Kick counts reviewed  - GBS swab collected today.  - Delivery consent reviewed and signed today. Risks of delivery reviewed, including bleeding, infection, damage to surrounding organs/structures (specifically bowel, bladder, vessels, nerves, ureters), need for operative vaginal delivery or  delivery, hysterectomy, need for blood transfusion, need for further surgery.   - Problem list updated, results console reviewed and updated with pertinent prenatal labs.  - PMH, PSH, medications reviewed and updated as needed  - Return to office in 1 wk(s) for routine prenatal care

## 2024-08-16 ENCOUNTER — HOSPITAL ENCOUNTER (INPATIENT)
Facility: HOSPITAL | Age: 29
LOS: 1 days | Discharge: HOME | DRG: 781 | End: 2024-08-16
Attending: OBSTETRICS & GYNECOLOGY | Admitting: OBSTETRICS & GYNECOLOGY
Payer: COMMERCIAL

## 2024-08-16 ENCOUNTER — NURSE TRIAGE (OUTPATIENT)
Dept: OTHER | Facility: OTHER | Age: 29
End: 2024-08-16

## 2024-08-16 VITALS
DIASTOLIC BLOOD PRESSURE: 61 MMHG | HEART RATE: 70 BPM | BODY MASS INDEX: 35.88 KG/M2 | HEIGHT: 62 IN | RESPIRATION RATE: 16 BRPM | SYSTOLIC BLOOD PRESSURE: 123 MMHG | WEIGHT: 195 LBS | TEMPERATURE: 98.8 F

## 2024-08-16 DIAGNOSIS — O47.9 UTERINE CONTRACTIONS: Primary | ICD-10-CM

## 2024-08-16 LAB
ABO GROUP BLD: NORMAL
ALBUMIN SERPL BCG-MCNC: 3.6 G/DL (ref 3.5–5)
ALP SERPL-CCNC: 156 U/L (ref 34–104)
ALT SERPL W P-5'-P-CCNC: 12 U/L (ref 7–52)
ANION GAP SERPL CALCULATED.3IONS-SCNC: 11 MMOL/L (ref 4–13)
AST SERPL W P-5'-P-CCNC: 14 U/L (ref 13–39)
BASOPHILS # BLD AUTO: 0.04 THOUSANDS/ÂΜL (ref 0–0.1)
BASOPHILS NFR BLD AUTO: 0 % (ref 0–1)
BILIRUB SERPL-MCNC: 0.29 MG/DL (ref 0.2–1)
BLD GP AB SCN SERPL QL: NEGATIVE
BUN SERPL-MCNC: 9 MG/DL (ref 5–25)
CALCIUM SERPL-MCNC: 9.6 MG/DL (ref 8.4–10.2)
CHLORIDE SERPL-SCNC: 106 MMOL/L (ref 96–108)
CO2 SERPL-SCNC: 20 MMOL/L (ref 21–32)
CREAT SERPL-MCNC: 0.62 MG/DL (ref 0.6–1.3)
CREAT UR-MCNC: 50.6 MG/DL
EOSINOPHIL # BLD AUTO: 0.1 THOUSAND/ÂΜL (ref 0–0.61)
EOSINOPHIL NFR BLD AUTO: 1 % (ref 0–6)
ERYTHROCYTE [DISTWIDTH] IN BLOOD BY AUTOMATED COUNT: 12.1 % (ref 11.6–15.1)
GFR SERPL CREATININE-BSD FRML MDRD: 121 ML/MIN/1.73SQ M
GLUCOSE SERPL-MCNC: 86 MG/DL (ref 65–140)
GP B STREP DNA SPEC QL NAA+PROBE: NOT DETECTED
HCT VFR BLD AUTO: 39.1 % (ref 34.8–46.1)
HGB BLD-MCNC: 13.9 G/DL (ref 11.5–15.4)
HOLD SPECIMEN: NORMAL
IMM GRANULOCYTES # BLD AUTO: 0.13 THOUSAND/UL (ref 0–0.2)
IMM GRANULOCYTES NFR BLD AUTO: 1 % (ref 0–2)
LYMPHOCYTES # BLD AUTO: 2.53 THOUSANDS/ÂΜL (ref 0.6–4.47)
LYMPHOCYTES NFR BLD AUTO: 20 % (ref 14–44)
MCH RBC QN AUTO: 30.6 PG (ref 26.8–34.3)
MCHC RBC AUTO-ENTMCNC: 35.5 G/DL (ref 31.4–37.4)
MCV RBC AUTO: 86 FL (ref 82–98)
MONOCYTES # BLD AUTO: 0.91 THOUSAND/ÂΜL (ref 0.17–1.22)
MONOCYTES NFR BLD AUTO: 7 % (ref 4–12)
NEUTROPHILS # BLD AUTO: 8.68 THOUSANDS/ÂΜL (ref 1.85–7.62)
NEUTS SEG NFR BLD AUTO: 71 % (ref 43–75)
NRBC BLD AUTO-RTO: 0 /100 WBCS
PLATELET # BLD AUTO: 243 THOUSANDS/UL (ref 149–390)
PMV BLD AUTO: 11.8 FL (ref 8.9–12.7)
POTASSIUM SERPL-SCNC: 4 MMOL/L (ref 3.5–5.3)
PROT SERPL-MCNC: 6.5 G/DL (ref 6.4–8.4)
PROT UR-MCNC: 9.2 MG/DL
PROT/CREAT UR: 0.2 MG/G{CREAT} (ref 0–0.1)
RBC # BLD AUTO: 4.54 MILLION/UL (ref 3.81–5.12)
RH BLD: POSITIVE
SODIUM SERPL-SCNC: 137 MMOL/L (ref 135–147)
SPECIMEN EXPIRATION DATE: NORMAL
SPECIMEN SOURCE: NORMAL
TREPONEMA PALLIDUM IGG+IGM AB [PRESENCE] IN SERUM OR PLASMA BY IMMUNOASSAY: NORMAL
WBC # BLD AUTO: 12.39 THOUSAND/UL (ref 4.31–10.16)

## 2024-08-16 PROCEDURE — 86780 TREPONEMA PALLIDUM: CPT | Performed by: OBSTETRICS & GYNECOLOGY

## 2024-08-16 PROCEDURE — 86900 BLOOD TYPING SEROLOGIC ABO: CPT | Performed by: OBSTETRICS & GYNECOLOGY

## 2024-08-16 PROCEDURE — NC001 PR NO CHARGE: Performed by: OBSTETRICS & GYNECOLOGY

## 2024-08-16 PROCEDURE — 99215 OFFICE O/P EST HI 40 MIN: CPT

## 2024-08-16 PROCEDURE — 80053 COMPREHEN METABOLIC PANEL: CPT | Performed by: OBSTETRICS & GYNECOLOGY

## 2024-08-16 PROCEDURE — 84156 ASSAY OF PROTEIN URINE: CPT | Performed by: OBSTETRICS & GYNECOLOGY

## 2024-08-16 PROCEDURE — 86850 RBC ANTIBODY SCREEN: CPT | Performed by: OBSTETRICS & GYNECOLOGY

## 2024-08-16 PROCEDURE — 82570 ASSAY OF URINE CREATININE: CPT | Performed by: OBSTETRICS & GYNECOLOGY

## 2024-08-16 PROCEDURE — 86901 BLOOD TYPING SEROLOGIC RH(D): CPT | Performed by: OBSTETRICS & GYNECOLOGY

## 2024-08-16 PROCEDURE — 85025 COMPLETE CBC W/AUTO DIFF WBC: CPT | Performed by: OBSTETRICS & GYNECOLOGY

## 2024-08-16 RX ORDER — BUPIVACAINE HYDROCHLORIDE 2.5 MG/ML
30 INJECTION, SOLUTION EPIDURAL; INFILTRATION; INTRACAUDAL ONCE AS NEEDED
Status: DISCONTINUED | OUTPATIENT
Start: 2024-08-16 | End: 2024-08-16 | Stop reason: HOSPADM

## 2024-08-16 RX ORDER — CALCIUM CARBONATE 500 MG/1
500 TABLET, CHEWABLE ORAL DAILY PRN
Status: DISCONTINUED | OUTPATIENT
Start: 2024-08-16 | End: 2024-08-16 | Stop reason: HOSPADM

## 2024-08-16 RX ORDER — ONDANSETRON 2 MG/ML
4 INJECTION INTRAMUSCULAR; INTRAVENOUS EVERY 6 HOURS PRN
Status: DISCONTINUED | OUTPATIENT
Start: 2024-08-16 | End: 2024-08-16 | Stop reason: HOSPADM

## 2024-08-16 RX ORDER — SODIUM CHLORIDE, SODIUM LACTATE, POTASSIUM CHLORIDE, CALCIUM CHLORIDE 600; 310; 30; 20 MG/100ML; MG/100ML; MG/100ML; MG/100ML
125 INJECTION, SOLUTION INTRAVENOUS CONTINUOUS
Status: DISCONTINUED | OUTPATIENT
Start: 2024-08-16 | End: 2024-08-16 | Stop reason: HOSPADM

## 2024-08-16 RX ADMIN — CALCIUM CARBONATE (ANTACID) CHEW TAB 500 MG 500 MG: 500 CHEW TAB at 09:36

## 2024-08-16 RX ADMIN — SODIUM CHLORIDE, SODIUM LACTATE, POTASSIUM CHLORIDE, AND CALCIUM CHLORIDE 125 ML/HR: .6; .31; .03; .02 INJECTION, SOLUTION INTRAVENOUS at 07:13

## 2024-08-16 RX ADMIN — SODIUM CHLORIDE, SODIUM LACTATE, POTASSIUM CHLORIDE, AND CALCIUM CHLORIDE 125 ML/HR: .6; .31; .03; .02 INJECTION, SOLUTION INTRAVENOUS at 05:55

## 2024-08-16 NOTE — TELEPHONE ENCOUNTER
ESC placed to on call provider for disposition advice.    Ok to send patient in to L&D triage per on call.    OB charge RN notified.
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
## 2024-08-16 NOTE — PROGRESS NOTES
Pt resting comfortable, feels mild contractions but not intensifying.  No bleeding, no leakage of fluid. No headaches or blurred vision.  /79 at 6 am  FHT 140s moderate variability + accels cat I  [place] irregular  Remaining BP normotensive  General appearance AAO X3 NAD  Unlabored breathing  Cor RR  Gravid abdomen   Extr +2 edema B/L    Cervix unchanged 2/70/-2    A/P 28 yo  @ 37 1/7 weeks   -early labor vs false labor,  no cervical change over last several hours  -one elevated BP  -will check urine PC ratio, CMP  -will reassess

## 2024-08-16 NOTE — UTILIZATION REVIEW
NOTIFICATION OF INPATIENT ADMISSION   Medical Admission/Maternity Unit   SERVICING FACILITY:   [facility] hospital  [facility] - L&D, [place], NICU  [address] [facility] [place] [address]  Tax ID: [id number]  NPI: [id number] ATTENDING PROVIDER:  Attending Name and NPI#: Elaine Waite Md [[id number]]  Address: [address] [facility] [place] [address]  Phone: [phone]     ADMISSION INFORMATION:  Place of Service: Inpatient [facility]  Place of Service Code: 21  Inpatient Admission Date/Time: 24  5:00 AM  Discharge Date/Time: 2024 10:14 AM  Admitting Diagnosis Code/Description:  Abdominal pain affecting pregnancy [O26.899, R10.9]     UTILIZATION REVIEW CONTACT:  Mary Daigle Utilization   Network Utilization Review Department  Phone: [phone]  Fax [phone]  Email: [email or web address]  Contact for approvals/pending authorizations, clinical reviews, and discharge.     PHYSICIAN ADVISORY SERVICES:  Medical Necessity Denial & Xauv-sp-Oujv Review  Phone: [phone]  Fax: [phone]  Email: [email or web address]     DISCHARGE SUPPORT TEAM:  For Patients Discharge Needs & Updates  Phone: [phone] opt. 2 Fax: [phone]  Email: [email or web address]

## 2024-08-16 NOTE — PROGRESS NOTES
Pt doing well.  No increase in contractions  /61  FHT cat I  [place] irregular  Cervix unchanged    Labs reviewed and WNL  Urine PC ratio 0.2    Discussed with patient that this is not active labor, no cervical change over several hours.  No clinical indication at this time for induction of labor at 37 weeks.  Recommend discharge home.  Follow up with OB for routine care and BP check.      Return precautions and labor s/s reviewed. Pt and partner agree with current plan of care.

## 2024-08-16 NOTE — TELEPHONE ENCOUNTER
"Reason for Disposition   [1] First baby (primipara) AND [2] contractions < 6 minutes apart  AND [3] present 2 hours    Answer Assessment - Initial Assessment Questions  1. ONSET: \"When did the symptoms begin?\"         Last night about     2. CONTRACTIONS: \"Describe the contractions that you are having.\" (e.g., duration, frequency, regularity, severity)  \"Not intense pressure - I can talk through them\"      4-7 min apart lasting about 1 minute each  Pelvic and lower back pressure    3. KATIE: \"What date are you expecting to deliver?\"      24    4. PARITY: \"Have you had a baby before?\" If Yes, ask: \"How long did the labor last?\"          5. FETAL MOVEMENT: \"Has the baby's movement decreased or changed significantly from normal?\"      Good fetal movement     6. OTHER SYMPTOMS: \"Do you have any other symptoms?\" (e.g., leaking fluid from vagina, vaginal bleeding, fever, hand/facial swelling)      Cramping    Protocols used: Pregnancy - Labor-ADULT-[facility]    "

## 2024-08-16 NOTE — UTILIZATION REVIEW
Initial Clinical Review    Admission: Date/Time/Statement:   Admission Orders (From admission, onward)       Ordered        24 0500  Inpatient Admission  Once                          Orders Placed This Encounter   Procedures    Inpatient Admission     Standing Status:   Standing     Number of Occurrences:   1     Order Specific Question:   Level of Care     Answer:   Med Surg [16]     Order Specific Question:   Estimated length of stay     Answer:   More than 2 Midnights     Order Specific Question:   Certification     Answer:   I certify that inpatient services are medically necessary for this patient for a duration of greater than two midnights. See H&P and MD Progress Notes for additional information about the patient's course of treatment.     ED Arrival Information       Patient not seen in ED                       Chief Complaint   Patient presents with    Contractions     Starting around 2000 last night and worsening around 0100.        Initial Presentation: 29 y.o. female  at 37 weeks by us and lmp.      IUP at 37 weeks.   GBS pending  Not under 37 weeks so will not begin antibiotics  Early labor              Cervix changed from 1 day ago in office              IVF, labs, admit, unsure about epidural plan     0825:  Pt resting comfortable, feels mild contractions but not intensifying. No bleeding, no leakage of fluid. No headaches or blurred vision.  /79 at 6 am  FHT 140s moderate variability + accels cat I  [place] irregular  Remaining BP normotensive  General appearance AAO X3 NAD  Unlabored breathing  Cor RR  Gravid abdomen   Extr +2 edema B/L     Cervix unchanged 2/70/-2     A/P 30 yo  @ 37 1/7 weeks   -early labor vs false labor,  no cervical change over last several hours  -one elevated BP  -will check urine PC ratio, CMP  -will reassess     1000:  Pt doing well.  No increase in contractions  /61  FHT cat I  [place] irregular  Cervix unchanged     Labs reviewed and  WNL  Urine PC ratio 0.2     Discussed with patient that this is not active labor, no cervical change over several hours.  No clinical indication at this time for induction of labor at 37 weeks.  Recommend discharge home.  Follow up with OB for routine care and BP check.      ED Triage Vitals   Temperature Pulse Respirations Blood Pressure SpO2 Pain Score   08/16/24 0449 08/16/24 0418 08/16/24 0449 08/16/24 0418 -- 08/16/24 0500   98.8 °F (37.1 °C) 69 18 139/85  3     Weight (last 2 days) before discharge       Date/Time Weight    08/16/24 0526 88.5 (195)            Vital Signs (last 3 days) before discharge       Date/Time Temp Pulse Resp BP Patient Position - Orthostatic VS Pain    08/16/24 0940 -- 70 -- 123/61 -- --    08/16/24 0602 -- 63 16 143/79 Sitting --    08/16/24 0500 -- -- -- -- -- 3    08/16/24 0449 98.8 °F (37.1 °C) 80 18 134/80 -- --    08/16/24 0434 -- 71 -- 132/80 -- --    08/16/24 0418 -- 69 -- 139/85 -- --              Pertinent Labs/Diagnostic Test Results:   Radiology:  No orders to display     Cardiology:  No orders to display     GI:  No orders to display           Results from last 7 days   Lab Units 08/16/24  0537   WBC Thousand/uL 12.39*   HEMOGLOBIN g/dL 13.9   HEMATOCRIT % 39.1   PLATELETS Thousands/uL 243   TOTAL NEUT ABS Thousands/µL 8.68*         Results from last 7 days   Lab Units 08/16/24  0906   SODIUM mmol/L 137   POTASSIUM mmol/L 4.0   CHLORIDE mmol/L 106   CO2 mmol/L 20*   ANION GAP mmol/L 11   BUN mg/dL 9   CREATININE mg/dL 0.62   EGFR ml/min/1.73sq m 121   CALCIUM mg/dL 9.6     Results from last 7 days   Lab Units 08/16/24  0906   AST U/L 14   ALT U/L 12   ALK PHOS U/L 156*   TOTAL PROTEIN g/dL 6.5   ALBUMIN g/dL 3.6   TOTAL BILIRUBIN mg/dL 0.29         Results from last 7 days   Lab Units 08/16/24  0906   GLUCOSE RANDOM mg/dL 86           Results from last 7 days   Lab Units 08/16/24  0906 08/14/24  1751   GLUCOSE UA   --  neg   PROTEIN UA   --  trace   CREATININE UR mg/dL  50.6  --    PROTEIN UR mg/dL 9.2  --    PROT/CREAT RATIO UR  0.2*  --          ED Treatment-Medication Administration - No Administrations Displayed (No Start Event Found)       None            Past Medical History:   Diagnosis Date    Varicella     as child     Present on Admission:  **None**      Admitting Diagnosis: Abdominal pain affecting pregnancy [O26.899, R10.9]  Age/Sex: 29 y.o. female  Admission Orders:  Scheduled Medications:     Continuous IV Infusions:  lactated ringers, 125 mL/hr, Intravenous, Continuous      PRN Meds:  bupivacaine (PF), 30 mL, Infiltration, Once PRN  calcium carbonate, 500 mg, Oral, Daily PRN  ondansetron, 4 mg, Intravenous, Q6H PRN        IP CONSULT TO ANESTHESIOLOGY    Network Utilization Review Department  ATTENTION: Please call with any questions or concerns to [phone] and carefully listen to the prompts so that you are directed to the right person. All voicemails are confidential.   For Discharge needs, contact Care Management DC Support Team at [phone] opt. 2  Send all requests for admission clinical reviews, approved or denied determinations and any other requests to dedicated fax number below belonging to the campus where the patient is receiving treatment. List of dedicated fax numbers for the Facilities:  FACILITY NAME UR FAX NUMBER   ADMISSION DENIALS (Administrative/Medical Necessity) [phone]   DISCHARGE SUPPORT TEAM (NETWORK) [phone]   PARENT CHILD HEALTH (Maternity/NICU/Pediatrics) [phone]   [facility] [phone]   [facility] [phone]   [facility] [phone]   [facility] [phone]   [facility] [phone]   [facility] [phone]   [facility] [phone]   [facility] [phone]   [facility]  [facility] [place] [phone]   [facility] [phone]   [facility] [phone]   [facility] [phone]

## 2024-08-20 ENCOUNTER — HOSPITAL ENCOUNTER (OUTPATIENT)
Facility: HOSPITAL | Age: 29
Discharge: HOME | End: 2024-08-20
Attending: OBSTETRICS & GYNECOLOGY | Admitting: OBSTETRICS & GYNECOLOGY
Payer: COMMERCIAL

## 2024-08-20 ENCOUNTER — NURSE TRIAGE (OUTPATIENT)
Dept: OTHER | Facility: OTHER | Age: 29
End: 2024-08-20

## 2024-08-20 VITALS
SYSTOLIC BLOOD PRESSURE: 128 MMHG | WEIGHT: 195 LBS | HEART RATE: 76 BPM | BODY MASS INDEX: 35.88 KG/M2 | DIASTOLIC BLOOD PRESSURE: 59 MMHG | TEMPERATURE: 97.8 F | HEIGHT: 62 IN | RESPIRATION RATE: 17 BRPM

## 2024-08-20 PROCEDURE — 59025 FETAL NON-STRESS TEST: CPT | Performed by: OBSTETRICS & GYNECOLOGY

## 2024-08-20 PROCEDURE — 99213 OFFICE O/P EST LOW 20 MIN: CPT | Performed by: OBSTETRICS & GYNECOLOGY

## 2024-08-20 PROCEDURE — 76815 OB US LIMITED FETUS(S): CPT | Performed by: OBSTETRICS & GYNECOLOGY

## 2024-08-20 PROCEDURE — 99214 OFFICE O/P EST MOD 30 MIN: CPT

## 2024-08-20 NOTE — PROCEDURES
Eboni Hilton, a  at 37w5d with an KATIE of 2024, by Last Menstrual Period, was seen at [facility] LABOR AND DELIVERY for the following procedure(s): $Procedure Type: BETSY, NST]    Nonstress Test  Variability: Moderate  Decelerations: None  Accelerations: Yes  Acoustic Stimulator: No  Baseline: 135 BPM  Uterine Irritability: No  Contractions: Irregular  Contraction Frequency (minutes): 5 min    4 Quadrant BETSY  BETSY Q1 (cm): 4.6 cm  BETSY Q2 (cm): 4.2 cm  BETSY Q3 (cm): 4.9 cm  BETSY Q4 (cm): 4.5 cm  BETSY TOTAL (cm): 18.2 cm              Interpretation  Nonstress Test Interpretation: Reactive  Overall Impression: Reassuring

## 2024-08-20 NOTE — TELEPHONE ENCOUNTER
"Reason for Disposition   [1] First baby (primipara) AND [2] contractions < 6 minutes apart  AND [3] present 2 hours    Answer Assessment - Initial Assessment Questions  1. ONSET: \"When did the symptoms begin?\"         11 pm     2. CONTRACTIONS: \"Describe the contractions that you are having.\" (e.g., duration, frequency, regularity, severity)      Every 4 to 6 minutes since 11 pm. Lasting 1 minute and 15 seconds.     3. KATIE: \"What date are you expecting to deliver?\"      09/05/2024    4. PARITY: \"Have you had a baby before?\" If Yes, ask: \"How long did the labor last?\"      First baby    5. FETAL MOVEMENT: \"Has the baby's movement decreased or changed significantly from normal?\"      Good FM     6. OTHER SYMPTOMS: \"Do you have any other symptoms?\" (e.g., leaking fluid from vagina, vaginal bleeding, fever, hand/facial swelling)      Shaking- shivering, had some fluid come out about 30 minutes ago and 15 minute ago that was enough to soak underwear x 2. Unsure if it is residual from intercourse with  at 9:30 pm. Not leaking at this time.     Patient is GBS-.     Sent an ESC to oncall provider for recommendations. Provider recommended patient to go to triage for evaluation. Patient is agreeable in plan and will arrive in 20-25 minutes.    Protocols used: Pregnancy - Labor-ADULT-    "

## 2024-08-20 NOTE — PROGRESS NOTES
"Triage Note - OB  Eboni Hilton 29 y.o. female MRN: [id number]  Unit/Bed#: LD TRIAGE 3 Encounter: [id number]    Chief Complaint: leaking, contractions   KATIE: Estimated Date of Delivery: 24    HPI: Patient is a  at 37w5d here complaining of an episode of leakage of fluid as well as ongoing contractions.   Had coitus around 0.   Had an episode of leakage that followed, but none on way to hospital or upon arrival.   Also had contractions that seem to have slowed down some on arrival.  More intense than on Thursday when she was here.   She denies bleeding.  Good fetal movement is noted.     Vitals: Blood pressure 128/59, pulse 76, temperature 97.8 °F (36.6 °C), temperature source Tympanic, resp. rate 17, height 5' 2\" (1.575 m), weight 88.5 kg (195 lb), last menstrual period 2023, not currently breastfeeding.,Body mass index is 35.67 kg/m².    Physical Exam  GEN: No acute distress  SVE: Dilation: 3cm, Effacement:  70%, Station:  -3, Consistency: soft, and Position:  posterior  SSE:  no pooling, negative Nitrazine, negative ferning    FHR: Baseline: 130 bpm, Variability: Moderate 6 - 25 bpm, Accelerations: Reactive, and Category 1  [place]: irregular, every 5 minutes    Labs: No results found for this or any previous visit (from the past 24 hour(s)).    Imaging:  BETSY 18.2cm, vertex    Lab, Imaging and other studies: I have personally reviewed pertinent reports.    A/P: 29 y.o. female at 37w5d not ruptured, not in labor    Discharge instructions given to patient and labor precautions reviewed.  Follow up in office as scheduled.             "

## 2024-08-21 ENCOUNTER — ROUTINE PRENATAL (OUTPATIENT)
Dept: OBGYN CLINIC | Facility: CLINIC | Age: 29
End: 2024-08-21
Payer: COMMERCIAL

## 2024-08-21 VITALS
DIASTOLIC BLOOD PRESSURE: 78 MMHG | BODY MASS INDEX: 36.25 KG/M2 | HEIGHT: 62 IN | SYSTOLIC BLOOD PRESSURE: 120 MMHG | WEIGHT: 197 LBS

## 2024-08-21 DIAGNOSIS — R51.9 HEADACHE IN PREGNANCY, ANTEPARTUM: Primary | ICD-10-CM

## 2024-08-21 DIAGNOSIS — Z3A.37 37 WEEKS GESTATION OF PREGNANCY: ICD-10-CM

## 2024-08-21 DIAGNOSIS — O26.899 HEADACHE IN PREGNANCY, ANTEPARTUM: Primary | ICD-10-CM

## 2024-08-21 LAB
SL AMB  POCT GLUCOSE, UA: NORMAL
SL AMB POCT URINE PROTEIN: NORMAL

## 2024-08-21 PROCEDURE — 81002 URINALYSIS NONAUTO W/O SCOPE: CPT | Performed by: STUDENT IN AN ORGANIZED HEALTH CARE EDUCATION/TRAINING PROGRAM

## 2024-08-21 PROCEDURE — PNV: Performed by: STUDENT IN AN ORGANIZED HEALTH CARE EDUCATION/TRAINING PROGRAM

## 2024-08-21 NOTE — PROGRESS NOTES
"Routine Prenatal Visit  [facility] - [address], [address], [place] [address]    Assessment/Plan:  Eboni is a 29 y.o. year old  at 37w6d who presents for routine prenatal visit.     1. Headache in pregnancy, antepartum  2. 37 weeks gestation of pregnancy  Assessment & Plan:  - Labor/Bleeding/ROM precautions given. Kick counts reviewed.  - GBS negative result reviewed.   - Problem list updated, results console reviewed and updated with pertinent prenatal labs.  - PMH, PSH, medications reviewed and updated as needed  - Return to office in 1 wk(s) for routine prenatal care    Orders:  -     POCT urine dip        Subjective:   Eboni Hilton is a 29 y.o.  who presents for routine prenatal care at 37w6d.  Complaints today: None  LOF: No; VB: No; Contractions: No; FM: Present    Objective:  /78 (BP Location: Right arm, Patient Position: Sitting, Cuff Size: Standard)   Ht 5' 2\" (1.575 m)   Wt 89.4 kg (197 lb)   LMP 2023 (Exact Date)   BMI 36.03 kg/m²     General: Well appearing, no distress  Respiratory: Unlabored breathing  Cardiovascular: Regular rate.  Abdomen: Soft, gravid, nontender  Extremities: Warm and well perfused.  Non tender.    Pregravid Weight/BMI: 72.6 kg (160 lb) (BMI 29.26)  Current Weight: 89.4 kg (197 lb)   Total Weight Gain: 16.8 kg (37 lb)     Pre- Vitals      Flowsheet Row Most Recent Value   Prenatal Assessment    Fetal Heart Rate 120   Fundal Height (cm) 38 cm   Movement Present   Presentation Vertex   Prenatal Vitals    Blood Pressure 120/78   Weight - Scale 89.4 kg (197 lb)   Urine Albumin/Glucose    Dilation/Effacement/Station    Vaginal Drainage    Draining Fluid No   Edema    LLE Edema None   RLE Edema None   Facial Edema None             Barrie Shay MD  2024 6:23 PM   "

## 2024-08-23 ENCOUNTER — HOSPITAL ENCOUNTER (INPATIENT)
Facility: HOSPITAL | Age: 29
LOS: 2 days | Discharge: HOME | End: 2024-08-25
Attending: OBSTETRICS & GYNECOLOGY | Admitting: OBSTETRICS & GYNECOLOGY
Payer: COMMERCIAL

## 2024-08-23 ENCOUNTER — NURSE TRIAGE (OUTPATIENT)
Age: 29
End: 2024-08-23

## 2024-08-23 ENCOUNTER — ANESTHESIA EVENT (INPATIENT)
Dept: ANESTHESIOLOGY | Facility: HOSPITAL | Age: 29
End: 2024-08-23
Payer: COMMERCIAL

## 2024-08-23 ENCOUNTER — ANESTHESIA (INPATIENT)
Dept: ANESTHESIOLOGY | Facility: HOSPITAL | Age: 29
End: 2024-08-23
Payer: COMMERCIAL

## 2024-08-23 PROBLEM — O14.93 PREECLAMPSIA, THIRD TRIMESTER: Status: ACTIVE | Noted: 2024-08-23

## 2024-08-23 PROBLEM — O42.90 PROM (PREMATURE RUPTURE OF MEMBRANES): Status: ACTIVE | Noted: 2024-08-23

## 2024-08-23 LAB
ABO GROUP BLD: NORMAL
ALBUMIN SERPL BCG-MCNC: 3.6 G/DL (ref 3.5–5)
ALP SERPL-CCNC: 155 U/L (ref 34–104)
ALT SERPL W P-5'-P-CCNC: 13 U/L (ref 7–52)
ANION GAP SERPL CALCULATED.3IONS-SCNC: 7 MMOL/L (ref 4–13)
AST SERPL W P-5'-P-CCNC: 14 U/L (ref 13–39)
BASE EXCESS BLDCOA CALC-SCNC: -5.3 MMOL/L (ref 3–11)
BASE EXCESS BLDCOV CALC-SCNC: -6.6 MMOL/L (ref 1–9)
BILIRUB SERPL-MCNC: 0.44 MG/DL (ref 0.2–1)
BLD GP AB SCN SERPL QL: NEGATIVE
BUN SERPL-MCNC: 10 MG/DL (ref 5–25)
CALCIUM SERPL-MCNC: 9.1 MG/DL (ref 8.4–10.2)
CHLORIDE SERPL-SCNC: 105 MMOL/L (ref 96–108)
CO2 SERPL-SCNC: 22 MMOL/L (ref 21–32)
CREAT SERPL-MCNC: 0.54 MG/DL (ref 0.6–1.3)
CREAT UR-MCNC: 119.2 MG/DL
ERYTHROCYTE [DISTWIDTH] IN BLOOD BY AUTOMATED COUNT: 12.1 % (ref 11.6–15.1)
GFR SERPL CREATININE-BSD FRML MDRD: 127 ML/MIN/1.73SQ M
GLUCOSE SERPL-MCNC: 80 MG/DL (ref 65–140)
HCO3 BLDCOA-SCNC: 23.6 MMOL/L (ref 17.3–27.3)
HCO3 BLDCOV-SCNC: 18.7 MMOL/L (ref 12.2–28.6)
HCT VFR BLD AUTO: 38.8 % (ref 34.8–46.1)
HGB BLD-MCNC: 13.7 G/DL (ref 11.5–15.4)
MCH RBC QN AUTO: 30.3 PG (ref 26.8–34.3)
MCHC RBC AUTO-ENTMCNC: 35.3 G/DL (ref 31.4–37.4)
MCV RBC AUTO: 86 FL (ref 82–98)
O2 CT VFR BLDCOA CALC: 9.5 ML/DL
OXYHGB MFR BLDCOA: 35.6 %
OXYHGB MFR BLDCOV: 63.5 %
PCO2 BLDCOA: 57.9 MM[HG] (ref 30–60)
PCO2 BLDCOV: 37.4 MM HG (ref 27–43)
PH BLDCOA: 7.23 [PH] (ref 7.23–7.43)
PH BLDCOV: 7.32 [PH] (ref 7.19–7.49)
PLATELET # BLD AUTO: 231 THOUSANDS/UL (ref 149–390)
PMV BLD AUTO: 11.4 FL (ref 8.9–12.7)
PO2 BLDCOA: 18.4 MM HG (ref 5–25)
PO2 BLDCOV: 26.5 MM HG (ref 15–45)
POTASSIUM SERPL-SCNC: 4 MMOL/L (ref 3.5–5.3)
PROT SERPL-MCNC: 6.5 G/DL (ref 6.4–8.4)
PROT UR-MCNC: 37.6 MG/DL
PROT/CREAT UR: 0.3 MG/G{CREAT} (ref 0–0.1)
RBC # BLD AUTO: 4.52 MILLION/UL (ref 3.81–5.12)
RH BLD: POSITIVE
SAO2 % BLDCOV: 17.3 ML/DL
SODIUM SERPL-SCNC: 134 MMOL/L (ref 135–147)
SPECIMEN EXPIRATION DATE: NORMAL
TREPONEMA PALLIDUM IGG+IGM AB [PRESENCE] IN SERUM OR PLASMA BY IMMUNOASSAY: NORMAL
WBC # BLD AUTO: 12.35 THOUSAND/UL (ref 4.31–10.16)

## 2024-08-23 PROCEDURE — 4A1HXCZ MONITORING OF PRODUCTS OF CONCEPTION, CARDIAC RATE, EXTERNAL APPROACH: ICD-10-PCS | Performed by: OBSTETRICS & GYNECOLOGY

## 2024-08-23 PROCEDURE — 88307 TISSUE EXAM BY PATHOLOGIST: CPT | Performed by: PATHOLOGY

## 2024-08-23 PROCEDURE — 0HQ9XZZ REPAIR PERINEUM SKIN, EXTERNAL APPROACH: ICD-10-PCS | Performed by: OBSTETRICS & GYNECOLOGY

## 2024-08-23 PROCEDURE — 59400 OBSTETRICAL CARE: CPT | Performed by: OBSTETRICS & GYNECOLOGY

## 2024-08-23 PROCEDURE — 82570 ASSAY OF URINE CREATININE: CPT | Performed by: OBSTETRICS & GYNECOLOGY

## 2024-08-23 PROCEDURE — 86780 TREPONEMA PALLIDUM: CPT | Performed by: OBSTETRICS & GYNECOLOGY

## 2024-08-23 PROCEDURE — 86901 BLOOD TYPING SEROLOGIC RH(D): CPT | Performed by: OBSTETRICS & GYNECOLOGY

## 2024-08-23 PROCEDURE — 99212 OFFICE O/P EST SF 10 MIN: CPT

## 2024-08-23 PROCEDURE — 82805 BLOOD GASES W/O2 SATURATION: CPT | Performed by: OBSTETRICS & GYNECOLOGY

## 2024-08-23 PROCEDURE — 80053 COMPREHEN METABOLIC PANEL: CPT | Performed by: OBSTETRICS & GYNECOLOGY

## 2024-08-23 PROCEDURE — 86900 BLOOD TYPING SEROLOGIC ABO: CPT | Performed by: OBSTETRICS & GYNECOLOGY

## 2024-08-23 PROCEDURE — 86850 RBC ANTIBODY SCREEN: CPT | Performed by: OBSTETRICS & GYNECOLOGY

## 2024-08-23 PROCEDURE — 84156 ASSAY OF PROTEIN URINE: CPT | Performed by: OBSTETRICS & GYNECOLOGY

## 2024-08-23 PROCEDURE — 3E033VJ INTRODUCTION OF OTHER HORMONE INTO PERIPHERAL VEIN, PERCUTANEOUS APPROACH: ICD-10-PCS | Performed by: OBSTETRICS & GYNECOLOGY

## 2024-08-23 PROCEDURE — NC001 PR NO CHARGE: Performed by: OBSTETRICS & GYNECOLOGY

## 2024-08-23 PROCEDURE — 85027 COMPLETE CBC AUTOMATED: CPT | Performed by: OBSTETRICS & GYNECOLOGY

## 2024-08-23 RX ORDER — OXYCODONE HYDROCHLORIDE 5 MG/1
5 TABLET ORAL
Status: DISCONTINUED | OUTPATIENT
Start: 2024-08-23 | End: 2024-08-25 | Stop reason: HOSPADM

## 2024-08-23 RX ORDER — BUPIVACAINE HYDROCHLORIDE 2.5 MG/ML
30 INJECTION, SOLUTION EPIDURAL; INFILTRATION; INTRACAUDAL ONCE AS NEEDED
Status: DISCONTINUED | OUTPATIENT
Start: 2024-08-23 | End: 2024-08-23

## 2024-08-23 RX ORDER — CALCIUM CARBONATE 500 MG/1
1000 TABLET, CHEWABLE ORAL DAILY PRN
Status: DISCONTINUED | OUTPATIENT
Start: 2024-08-23 | End: 2024-08-25 | Stop reason: HOSPADM

## 2024-08-23 RX ORDER — OXYTOCIN/RINGER'S LACTATE 30/500 ML
1-30 PLASTIC BAG, INJECTION (ML) INTRAVENOUS
Status: DISCONTINUED | OUTPATIENT
Start: 2024-08-23 | End: 2024-08-23

## 2024-08-23 RX ORDER — SODIUM CHLORIDE, SODIUM LACTATE, POTASSIUM CHLORIDE, CALCIUM CHLORIDE 600; 310; 30; 20 MG/100ML; MG/100ML; MG/100ML; MG/100ML
125 INJECTION, SOLUTION INTRAVENOUS CONTINUOUS
Status: DISCONTINUED | OUTPATIENT
Start: 2024-08-23 | End: 2024-08-23

## 2024-08-23 RX ORDER — DIPHENHYDRAMINE HCL 25 MG
25 TABLET ORAL EVERY 6 HOURS PRN
Status: DISCONTINUED | OUTPATIENT
Start: 2024-08-23 | End: 2024-08-25 | Stop reason: HOSPADM

## 2024-08-23 RX ORDER — ONDANSETRON 2 MG/ML
4 INJECTION INTRAMUSCULAR; INTRAVENOUS EVERY 8 HOURS PRN
Status: DISCONTINUED | OUTPATIENT
Start: 2024-08-23 | End: 2024-08-25 | Stop reason: HOSPADM

## 2024-08-23 RX ORDER — OXYTOCIN/RINGER'S LACTATE 30/500 ML
250 PLASTIC BAG, INJECTION (ML) INTRAVENOUS ONCE
Status: DISCONTINUED | OUTPATIENT
Start: 2024-08-23 | End: 2024-08-25 | Stop reason: HOSPADM

## 2024-08-23 RX ORDER — ONDANSETRON 2 MG/ML
4 INJECTION INTRAMUSCULAR; INTRAVENOUS EVERY 6 HOURS PRN
Status: DISCONTINUED | OUTPATIENT
Start: 2024-08-23 | End: 2024-08-23 | Stop reason: SDUPTHER

## 2024-08-23 RX ORDER — SIMETHICONE 80 MG
80 TABLET,CHEWABLE ORAL 4 TIMES DAILY PRN
Status: DISCONTINUED | OUTPATIENT
Start: 2024-08-23 | End: 2024-08-25 | Stop reason: HOSPADM

## 2024-08-23 RX ORDER — DOCUSATE SODIUM 100 MG/1
100 CAPSULE, LIQUID FILLED ORAL 2 TIMES DAILY
Status: DISCONTINUED | OUTPATIENT
Start: 2024-08-23 | End: 2024-08-25 | Stop reason: HOSPADM

## 2024-08-23 RX ORDER — LIDOCAINE HYDROCHLORIDE AND EPINEPHRINE 15; 5 MG/ML; UG/ML
INJECTION, SOLUTION EPIDURAL
Status: COMPLETED | OUTPATIENT
Start: 2024-08-23 | End: 2024-08-23

## 2024-08-23 RX ORDER — ACETAMINOPHEN 325 MG/1
650 TABLET ORAL EVERY 6 HOURS PRN
Status: DISCONTINUED | OUTPATIENT
Start: 2024-08-23 | End: 2024-08-23 | Stop reason: SDUPTHER

## 2024-08-23 RX ORDER — BENZOCAINE/MENTHOL 6 MG-10 MG
1 LOZENGE MUCOUS MEMBRANE DAILY PRN
Status: DISCONTINUED | OUTPATIENT
Start: 2024-08-23 | End: 2024-08-25 | Stop reason: HOSPADM

## 2024-08-23 RX ORDER — IBUPROFEN 600 MG/1
600 TABLET, FILM COATED ORAL EVERY 6 HOURS
Status: DISCONTINUED | OUTPATIENT
Start: 2024-08-23 | End: 2024-08-25 | Stop reason: HOSPADM

## 2024-08-23 RX ORDER — ROPIVACAINE HYDROCHLORIDE 2 MG/ML
INJECTION, SOLUTION EPIDURAL; INFILTRATION; PERINEURAL CONTINUOUS PRN
Status: DISCONTINUED | OUTPATIENT
Start: 2024-08-23 | End: 2024-08-26 | Stop reason: HOSPADM

## 2024-08-23 RX ORDER — ACETAMINOPHEN 325 MG/1
650 TABLET ORAL EVERY 4 HOURS PRN
Status: DISCONTINUED | OUTPATIENT
Start: 2024-08-23 | End: 2024-08-25 | Stop reason: HOSPADM

## 2024-08-23 RX ADMIN — ACETAMINOPHEN 650 MG: 325 TABLET ORAL at 14:53

## 2024-08-23 RX ADMIN — LIDOCAINE HYDROCHLORIDE,EPINEPHRINE BITARTRATE 2 ML: 15; .005 INJECTION, SOLUTION EPIDURAL; INFILTRATION; INTRACAUDAL; PERINEURAL at 17:07

## 2024-08-23 RX ADMIN — BENZOCAINE AND LEVOMENTHOL 1 APPLICATION: 200; 5 SPRAY TOPICAL at 23:12

## 2024-08-23 RX ADMIN — Medication 2 MILLI-UNITS/MIN: at 14:39

## 2024-08-23 RX ADMIN — ROPIVACAINE HYDROCHLORIDE 10 ML/HR: 2 INJECTION, SOLUTION EPIDURAL; INFILTRATION at 17:16

## 2024-08-23 RX ADMIN — IBUPROFEN 600 MG: 600 TABLET, FILM COATED ORAL at 22:59

## 2024-08-23 RX ADMIN — SODIUM CHLORIDE, SODIUM LACTATE, POTASSIUM CHLORIDE, AND CALCIUM CHLORIDE 125 ML/HR: .6; .31; .03; .02 INJECTION, SOLUTION INTRAVENOUS at 14:38

## 2024-08-23 RX ADMIN — SODIUM CHLORIDE, SODIUM LACTATE, POTASSIUM CHLORIDE, AND CALCIUM CHLORIDE 999 ML/HR: .6; .31; .03; .02 INJECTION, SOLUTION INTRAVENOUS at 17:14

## 2024-08-23 RX ADMIN — ROPIVACAINE HYDROCHLORIDE: 2 INJECTION, SOLUTION EPIDURAL; INFILTRATION at 17:23

## 2024-08-23 RX ADMIN — LIDOCAINE HYDROCHLORIDE,EPINEPHRINE BITARTRATE 3 ML: 15; .005 INJECTION, SOLUTION EPIDURAL; INFILTRATION; INTRACAUDAL; PERINEURAL at 17:00

## 2024-08-23 RX ADMIN — WITCH HAZEL 1 PAD: 500 SOLUTION RECTAL; TOPICAL at 23:12

## 2024-08-23 NOTE — ANESTHESIA PROCEDURE NOTES
Epidural Block    Start time: 8/23/2024 5:00 PM  Reason for block: procedure for pain  Staffing  Performed by: Siri Mckeon MD  Authorized by: Siri Mckeon MD    Preanesthetic Checklist  Completed: patient identified, IV checked, site marked, risks and benefits discussed, surgical consent, monitors and equipment checked, pre-op evaluation and timeout performed  Epidural  Patient position: sitting  Prep: ChloraPrep  Sedation Level: no sedation  Patient monitoring: frequent blood pressure checks, continuous pulse oximetry and heart rate  Approach: midline  Location: lumbar, L3-4  Injection technique: HOMERO saline  Needle  Needle type: Tuohy   Needle gauge: 17 G  Needle insertion depth: 8 cm  Catheter type: multi-orifice  Catheter size: 20 G  Catheter at skin depth: 14 cm  Catheter securement method: stabilization device and clear occlusive dressing  Test dose: negativelidocaine-epinephrine (XYLOCAINE-MPF/EPINEPHRINE) 1.5 %-1:200,000 injection 3 mL - Epidural   3 mL - 8/23/2024 5:00:00 PM  Assessment  Sensory level: T10  Number of attempts: 1negative aspiration for CSF, negative aspiration for heme and no paresthesia on injection  patient tolerated the procedure well with no immediate complications

## 2024-08-23 NOTE — OB LABOR/OXYTOCIN SAFETY PROGRESS
Labor Progress Note - Eboni Hilton 29 y.o. female MRN: [id number]    Unit/Bed#: -01 Encounter: [id number]    Dose (jeremías-units/min) Oxytocin: 4 jeremías-units/min  Contraction Frequency (minutes): 2-5  Contraction Intensity: Mild  Uterine Activity Characteristics: Occasional  Cervical Dilation: 3        Cervical Effacement: 70  Fetal Station: -3  Baseline Rate (FHR): 140 bpm  Fetal Heart Rate (FHT): 135 BPM  FHR Category: 1               Vital Signs:   Vitals:    08/23/24 1128   BP: 131/84   Pulse: 75   Resp:    Temp:        Notes/comments:   Pt comfortable. After further discussion with patient, she feels PROM occurred at 7am today. She reports no leaking at all yesterday. Pitocin recently started due to acuity on labor floor. Epidural as needed.       Raghav Romeo DO 8/23/2024 3:44 PM

## 2024-08-23 NOTE — OB LABOR/OXYTOCIN SAFETY PROGRESS
Labor Progress Note - Eboni Hilton 29 y.o. female MRN: [id number]    Unit/Bed#: -01 Encounter: [id number]    Dose (jeremías-units/min) Oxytocin: 4 jeremías-units/min  Contraction Frequency (minutes): 2-5  Contraction Intensity: Mild  Uterine Activity Characteristics: Occasional  Cervical Dilation: 5        Cervical Effacement: 80  Fetal Station: -1  Baseline Rate (FHR): 140 bpm  Fetal Heart Rate (FHT): 132 BPM  FHR Category: 1               Vital Signs:   Vitals:    08/23/24 1725   BP: 148/73   Pulse: 96   Resp:    Temp:        Notes/comments:   Doing well, comfortable after epidural. Making good change.       Raghav Romeo DO 8/23/2024 5:32 PM

## 2024-08-23 NOTE — ANESTHESIA PREPROCEDURE EVALUATION
"Procedure:  LABOR ANALGESIA    Relevant Problems   CARDIO   (+) Preeclampsia, third trimester      GYN   (+) 37 weeks gestation of pregnancy      NEURO/PSYCH   (+) Headache in pregnancy, antepartum      Obstetrics/Gynecology   (+) PROM (premature rupture of membranes)        Physical Exam    Airway    Mallampati score: II  TM Distance: >3 FB  Neck ROM: full     Dental   No notable dental hx     Cardiovascular  Cardiovascular exam normal    Pulmonary  Pulmonary exam normal     Other Findings  post-pubertal.      Anesthesia Plan  ASA Score- 2     Anesthesia Type- epidural with ASA Monitors.         Additional Monitors:     Airway Plan:            Plan Factors-    Chart reviewed.   Existing labs reviewed. Patient summary reviewed.                  Induction-     Postoperative Plan-     Perioperative Resuscitation Plan - Level 1 - Full Code.       Informed Consent- Anesthetic plan and risks discussed with patient.  I personally reviewed this patient with the CRNA. Discussed and agreed on the Anesthesia Plan with the CRNA..        No results found for: \"HGBA1C\"    Lab Results   Component Value Date    K 4.0 08/23/2024     08/23/2024    CO2 22 08/23/2024    BUN 10 08/23/2024    CREATININE 0.54 (L) 08/23/2024    CALCIUM 9.1 08/23/2024    AST 14 08/23/2024    ALT 13 08/23/2024    ALKPHOS 155 (H) 08/23/2024    EGFR 127 08/23/2024       Lab Results   Component Value Date    WBC 12.35 (H) 08/23/2024    HGB 13.7 08/23/2024    HCT 38.8 08/23/2024    MCV 86 08/23/2024     08/23/2024     "

## 2024-08-23 NOTE — PLAN OF CARE
Problem: BIRTH - VAGINAL/ SECTION  Goal: Fetal and maternal status remain reassuring during the birth process  Description: INTERVENTIONS:  - Monitor vital signs  - Monitor fetal heart rate  - Monitor uterine activity  - Monitor labor progression (vaginal delivery)  - DVT prophylaxis  - Antibiotic prophylaxis  Outcome: Progressing  Goal: Emotionally satisfying birthing experience for mother/fetus  Description: Interventions:  - Assess, plan, implement and evaluate the nursing care given to the patient in labor  - Advocate the philosophy that each childbirth experience is a unique experience and support the family's chosen level of involvement and control during the labor process   - Actively participate in both the patient's and family's teaching of the birth process  - Consider cultural, [employer]ism and age-specific factors and plan care for the patient in labor  Outcome: Progressing     Problem: PAIN - ADULT  Goal: Verbalizes/displays adequate comfort level or baseline comfort level  Description: Interventions:  - Encourage patient to monitor pain and request assistance  - Assess pain using appropriate pain scale  - Administer analgesics based on type and severity of pain and evaluate response  - Implement non-pharmacological measures as appropriate and evaluate response  - Consider cultural and social influences on pain and pain management  - Notify physician/advanced practitioner if interventions unsuccessful or patient reports new pain  Outcome: Progressing     Problem: INFECTION - ADULT  Goal: Absence or prevention of progression during hospitalization  Description: INTERVENTIONS:  - Assess and monitor for signs and symptoms of infection  - Monitor lab/diagnostic results  - Monitor all insertion sites, i.e. indwelling lines, tubes, and drains  - Monitor endotracheal if appropriate and nasal secretions for changes in amount and color  - Nora appropriate cooling/warming therapies per order  -  Administer medications as ordered  - Instruct and encourage patient and family to use good hand hygiene technique  - Identify and instruct in appropriate isolation precautions for identified infection/condition  Outcome: Progressing  Goal: Absence of fever/infection during neutropenic period  Description: INTERVENTIONS:  - Monitor WBC    Outcome: Progressing     Problem: SAFETY ADULT  Goal: Patient will remain free of falls  Description: INTERVENTIONS:  - Educate patient/family on patient safety including physical limitations  - Instruct patient to call for assistance with activity   - Consult OT/PT to assist with strengthening/mobility   - Keep Call bell within reach  - Keep bed low and locked with side rails adjusted as appropriate  - Keep care items and personal belongings within reach  - Initiate and maintain comfort rounds  - Make Fall Risk Sign visible to staff  - Apply yellow socks and bracelet for high fall risk patients  - Consider moving patient to room near nurses station  Outcome: Progressing  Goal: Maintain or return to baseline ADL function  Description: INTERVENTIONS:  -  Assess patient's ability to carry out ADLs; assess patient's baseline for ADL function and identify physical deficits which impact ability to perform ADLs (bathing, care of mouth/teeth, toileting, grooming, dressing, etc.)  - Assess/evaluate cause of self-care deficits   - Assess range of motion  - Assess patient's mobility; develop plan if impaired  - Assess patient's need for assistive devices and provide as appropriate  - Encourage maximum independence but intervene and supervise when necessary  - Involve family in performance of ADLs  - Assess for home care needs following discharge   - Consider OT consult to assist with ADL evaluation and planning for discharge  - Provide patient education as appropriate  Outcome: Progressing  Goal: Maintains/Returns to pre admission functional level  Description: INTERVENTIONS:  - Perform  AM-PAC 6 Click Basic Mobility/ Daily Activity assessment daily.  - Set and communicate daily mobility goal to care team and patient/family/caregiver.   - Collaborate with rehabilitation services on mobility goals if consulted  - Out of bed for toileting  - Record patient progress and toleration of activity level   Outcome: Progressing     Problem: Knowledge Deficit  Goal: Patient/family/caregiver demonstrates understanding of disease process, treatment plan, medications, and discharge instructions  Description: Complete learning assessment and assess knowledge base.  Interventions:  - Provide teaching at level of understanding  - Provide teaching via preferred learning methods  Outcome: Progressing     Problem: DISCHARGE PLANNING  Goal: Discharge to home or other facility with appropriate resources  Description: INTERVENTIONS:  - Identify barriers to discharge w/patient and caregiver  - Arrange for needed discharge resources and transportation as appropriate  - Identify discharge learning needs (meds, wound care, etc.)  - Arrange for interpretive services to assist at discharge as needed  - Refer to Case Management Department for coordinating discharge planning if the patient needs post-hospital services based on physician/advanced practitioner order or complex needs related to functional status, cognitive ability, or social support system  Outcome: Progressing

## 2024-08-23 NOTE — TELEPHONE ENCOUNTER
"Spoke with patient who is 38w1d, .  She reports overnight she started with some contractions, irregular.  She reports losing her mucus plug and having some red tinged mucus. She also reports what feels like continued leaking of fluid, but no gush of fluid.  She denies any pain and reports good fetal movement.  She was advised to go to L&D for further evaluation.      SEC to on call provider and L&D Charge RN UB.    Reason for Disposition   Leakage of fluid from vagina    Answer Assessment - Initial Assessment Questions  1. ONSET: \"When did the symptoms begin?\"         Overnight sometime  2. CONTRACTIONS: \"Are you having any contractions?\" If yes, ask: \"Describe the contractions that you are having.\" (e.g., duration, frequency, regularity, severity)      irregular  3. KATIE: \"What date are you expecting to deliver?\"      24  4. PARITY: \"Have you had a baby before?\" If Yes, ask: \"How long did the labor last?\"      no  5. FETAL MOVEMENT: \"Has the baby's movement decreased or changed significantly from normal?\"      good  6. OTHER SYMPTOMS: \"Do you have any other symptoms?\" (e.g., abdominal pain, vaginal bleeding, fever, hand/facial swelling)      Loss of mucus plug    Protocols used: Pregnancy - Rupture of Membranes-ADULT-[place]    "

## 2024-08-23 NOTE — H&P
History & Physical - OB/GYN   Eboni Hilton 29 y.o. female MRN: [id number]  Unit/Bed#: LD TRIAGE 3 Encounter: [id number]    29 y.o. yo  at 38w1d weeks presents with complaint of leaking clear/blood tinged fluid and irregular mild CTX.    She was found to have mildly elevated BP on presentation. She had another mildly elevated BP in triage last week.      Chief complaint:  LOF, CTX    HPI:  Contractions:  yes  Fetal movement:  yes  Vaginal bleeding:   leaking blood tinged fluid  Leaking of fluid:  yes      Pregnancy Complications:  Patient Active Problem List   Diagnosis    37 weeks gestation of pregnancy    Headache in pregnancy, antepartum    False labor    Preeclampsia, third trimester    PROM (premature rupture of membranes)       PMH:  Past Medical History:   Diagnosis Date    Varicella     as child       PSH:  Past Surgical History:   Procedure Laterality Date    FL LUMBAR PUNCTURE DIAGNOSTIC  2016    TONSILECTOMY AND ADNOIDECTOMY         Social Hx:  Social History     Tobacco Use    Smoking status: Never     Passive exposure: Never    Smokeless tobacco: Never   Vaping Use    Vaping status: Never Used   Substance Use Topics    Alcohol use: Not Currently     Alcohol/week: 3.0 standard drinks of alcohol     Types: 3 Glasses of wine per week     Comment: socially    Drug use: Never         OB Hx:  OB History    Para Term  AB Living   1 0 0 0 0 0   SAB IAB Ectopic Multiple Live Births   0 0 0 0 0      # Outcome Date GA Lbr Oswald/2nd Weight Sex Type Anes PTL Lv   1 Current                Meds:  No current facility-administered medications on file prior to encounter.     Current Outpatient Medications on File Prior to Encounter   Medication Sig Dispense Refill    famotidine (Pepcid AC Maximum Strength) 20 mg tablet       Magnesium Oxide 400 MG CAPS Take 1 tablet (400 mg total) by mouth daily 90 capsule 1    Prenatal Vit-Fe Fumarate-FA (PRENATAL VITAMINS PO)       Riboflavin 400 MG CAPS  Take 1 capsule (400 mg total) by mouth daily         Allergies:  Allergies   Allergen Reactions    Amoxicillin Other (See Comments)    Amoxicillin-Pot Clavulanate Other (See Comments)    Cefdinir Other (See Comments)    Tetanus Toxoids Swelling     Got shot in .Received IV meds for swelling       Labs:  Blood type: B+  Antibody: negative  Group B strep: negative  HIV: negative  Hepatitis B: negative  RPR: neg  Rubella: Immune  1 hour Glucose: 101    Physical Exam:  /84   Pulse 75   Temp 98.2 °F (36.8 °C) (Oral)   Resp 18   LMP 2023 (Exact Date)         HEENT: atraumatic, normocephalic  Lungs: normal effort  Abdomen: gravid, non-tender, non-distended  Extremities: non-tender, no edema    Estimated Fetal Weight: 7.5 lbs  Presentation: vertex by scan    SVE: 3 / 70% / -3  FHT:  140 / Moderate 6 - 25 bpm / +accels, no decels  [place]: irregular    Membranes: Grossly ruptured, + Nitrazine    Labs:   Admission on 2024   Component Date Value    WBC 2024 12.35 (H)     RBC 2024 4.52     Hemoglobin 2024 13.7     Hematocrit 2024 38.8     MCV 2024 86     MCH 2024 30.3     MCHC 2024 35.3     RDW 2024 12.1     Platelets 2024 231     MPV 2024 11.4     Sodium 2024 134 (L)     Potassium 2024 4.0     Chloride 2024 105     CO2 2024 22     ANION GAP 2024 7     BUN 2024 10     Creatinine 2024 0.54 (L)     Glucose 2024 80     Calcium 2024 9.1     AST 2024 14     ALT 2024 13     Alkaline Phosphatase 2024 155 (H)     Total Protein 2024 6.5     Albumin 2024 3.6     Total Bilirubin 2024 0.44     eGFR 2024 127     Creatinine, Ur 2024 119.2     Protein Urine Random 2024 37.6     Prot/Creat Ratio, Ur 2024 0.3 (H)      Assessment:   29 y.o.  at 38w1d weeks , with PROM. Meets criteria for pre-E without severe features.     Plan:   1. Admit  to L&D    2. Pre-E without severe features: No BP meds indicated at this time. Will monitor.     3. Will start pitocin for augmentation

## 2024-08-24 PROBLEM — O14.03: Status: ACTIVE | Noted: 2024-08-24

## 2024-08-24 PROBLEM — O14.93 PREECLAMPSIA, THIRD TRIMESTER: Status: RESOLVED | Noted: 2024-08-23 | Resolved: 2024-08-24

## 2024-08-24 LAB
BASOPHILS # BLD MANUAL: 0 THOUSAND/UL (ref 0–0.1)
BASOPHILS NFR MAR MANUAL: 0 % (ref 0–1)
EOSINOPHIL # BLD MANUAL: 0 THOUSAND/UL (ref 0–0.4)
EOSINOPHIL NFR BLD MANUAL: 0 % (ref 0–6)
ERYTHROCYTE [DISTWIDTH] IN BLOOD BY AUTOMATED COUNT: 12.1 % (ref 11.6–15.1)
HCT VFR BLD AUTO: 37.5 % (ref 34.8–46.1)
HGB BLD-MCNC: 13.2 G/DL (ref 11.5–15.4)
LYMPHOCYTES # BLD AUTO: 14 % (ref 14–44)
LYMPHOCYTES # BLD AUTO: 2.75 THOUSAND/UL (ref 0.6–4.47)
MCH RBC QN AUTO: 30.6 PG (ref 26.8–34.3)
MCHC RBC AUTO-ENTMCNC: 35.2 G/DL (ref 31.4–37.4)
MCV RBC AUTO: 87 FL (ref 82–98)
MONOCYTES # BLD AUTO: 0.79 THOUSAND/UL (ref 0–1.22)
MONOCYTES NFR BLD: 4 % (ref 4–12)
NEUTROPHILS # BLD MANUAL: 16.11 THOUSAND/UL (ref 1.85–7.62)
NEUTS SEG NFR BLD AUTO: 82 % (ref 43–75)
PLATELET # BLD AUTO: 214 THOUSANDS/UL (ref 149–390)
PLATELET BLD QL SMEAR: ADEQUATE
PMV BLD AUTO: 11.5 FL (ref 8.9–12.7)
RBC # BLD AUTO: 4.31 MILLION/UL (ref 3.81–5.12)
RBC MORPH BLD: NORMAL
WBC # BLD AUTO: 19.65 THOUSAND/UL (ref 4.31–10.16)

## 2024-08-24 PROCEDURE — 99024 POSTOP FOLLOW-UP VISIT: CPT | Performed by: OBSTETRICS & GYNECOLOGY

## 2024-08-24 PROCEDURE — 85027 COMPLETE CBC AUTOMATED: CPT | Performed by: OBSTETRICS & GYNECOLOGY

## 2024-08-24 PROCEDURE — 85007 BL SMEAR W/DIFF WBC COUNT: CPT | Performed by: OBSTETRICS & GYNECOLOGY

## 2024-08-24 RX ADMIN — ACETAMINOPHEN 650 MG: 325 TABLET ORAL at 11:06

## 2024-08-24 RX ADMIN — IBUPROFEN 600 MG: 600 TABLET, FILM COATED ORAL at 07:23

## 2024-08-24 RX ADMIN — DOCUSATE SODIUM 100 MG: 100 CAPSULE, LIQUID FILLED ORAL at 07:24

## 2024-08-24 RX ADMIN — IBUPROFEN 600 MG: 600 TABLET, FILM COATED ORAL at 15:37

## 2024-08-24 NOTE — PLAN OF CARE

## 2024-08-24 NOTE — OB LABOR/OXYTOCIN SAFETY PROGRESS
Oxytocin Safety Progress Check Note - Eboni Hilton 29 y.o. female MRN: [id number]    Unit/Bed#: -01 Encounter: [id number]    Dose (jeremías-units/min) Oxytocin: 8 jeremías-units/min  Contraction Frequency (minutes): 2-3  Contraction Intensity: Moderate  Uterine Activity Characteristics: Regular  Cervical Dilation: 10        Cervical Effacement: 100  Fetal Station: 1  Baseline Rate (FHR): 135 bpm  Fetal Heart Rate (FHT): 132 BPM  FHR Category:  - occasional variable decels, moderate variability present               Vital Signs:   Vitals:    08/23/24 1935   BP: 133/68   Pulse: 82   Resp:    Temp:    SpO2:        Notes/comments:   Will start pushing      Abimbola Peters MD 8/23/2024 8:02 PM

## 2024-08-24 NOTE — PROGRESS NOTES
Progress Note - OB/GYN  Post-Partum Physician Note   Eboni Hilton 29 y.o. female MRN: [id number]  Unit/Bed#:  206 Encounter: [id number]    Assessment and Plan:  29 y.o. year-old , postpartum day 1 status-post  Vaginal, Spontaneous and 1st degree laceration.    Continue routine postpartum care  Encourage ambulation  Advance diet as tolerated  VTE ppx: ambulation  (Postpartum VTE risk score of 2 - treat if >/= 3)  Planning discharge tomorrow        Sarita Méndez MD    -----------------    Patient is postpartum day 1 from a Vaginal, Spontaneous with a 1st degree laceration and Anesthesia: epidural    Subjective:   Pain: no  Tolerating Oral Intake: yes  Voiding: yes  Flatus: yes  Bowel Movement: no  Ambulating: yes  Breastfeeding: Breastfeeding  Shortness of Breath: no  Leg Pain/Discomfort: no  Lochia: normal      Objective:   Vitals:    24 2250 24 2305 24 0324 24 0728   BP: 137/79 138/82 136/79 128/68   BP Location:   Left arm Left arm   Pulse: 75 82 73 74   Resp:   18 16   Temp:   98 °F (36.7 °C) 98 °F (36.7 °C)   TempSrc:   Oral Oral   SpO2:    98%   Weight:       Height:           Intake/Output Summary (Last 24 hours) at 2024 0855  Last data filed at 2024 0534  Gross per 24 hour   Intake 963.85 ml   Output 1078 ml   Net -114.15 ml       Physical Exam:  General: in no apparent distress  Abdomen: abdomen is soft without significant tenderness  Fundus: Firm and non-tender, 2 below the umbilicus  Perineum: exam deferred  Lower extremities: nontender, +1 edema bilaterally      Labs/Tests:   Lab Results   Component Value Date    WBC 19.65 (H) 2024    HGB 13.2 2024    HCT 37.5 2024    MCV 87 2024     2024       Brief OB Lab review:  ABO Grouping   Date Value Ref Range Status   2024 B  Final      Rh Factor   Date Value Ref Range Status   2024 Positive  Final     Rh Type   Date Value Ref Range Status   2024 RH(D) POSITIVE  " Final     Comment:        For additional information, please refer to   [email or web address]   (This link is being provided for informational/  educational purposes only.)      No results found for: \"ANTIBODYSCR\"  No results found for: \"RUBM\"    MEDS:   Current Facility-Administered Medications   Medication Dose Route Frequency    acetaminophen (TYLENOL) tablet 650 mg  650 mg Oral Q4H PRN    benzocaine-menthol-lanolin-aloe (DERMOPLAST) 20-0.5 % topical spray 1 Application  1 Application Topical Q6H PRN    calcium carbonate (TUMS) chewable tablet 1,000 mg  1,000 mg Oral Daily PRN    diphenhydrAMINE (BENADRYL) tablet 25 mg  25 mg Oral Q6H PRN    docusate sodium (COLACE) capsule 100 mg  100 mg Oral BID    hydrocortisone 1 % cream 1 Application  1 Application Topical Daily PRN    ibuprofen (MOTRIN) tablet 600 mg  600 mg Oral Q6H    ondansetron (ZOFRAN) injection 4 mg  4 mg Intravenous Q8H PRN    oxyCODONE (ROXICODONE) IR tablet 5 mg  5 mg Oral Q3H PRN    oxytocin (PITOCIN) 30 Units in lactated ringers 500 mL infusion  250 jeremías-units/min Intravenous Once    simethicone (MYLICON) chewable tablet 80 mg  80 mg Oral 4x Daily PRN    witch hazel-glycerin (TUCKS) topical pad 1 Pad  1 Pad Topical Q4H PRN     Facility-Administered Medications Ordered in Other Encounters   Medication Dose Route Frequency    ropivacaine (NAROPIN) injection   Epidural Continuous PRN     Invasive Devices       Peripheral Intravenous Line  Duration             Peripheral IV 08/23/24 Distal;Dorsal (posterior);Left Forearm <1 day              Epidural Line  Duration             Epidural Catheter 08/23/24 <1 day                           "

## 2024-08-24 NOTE — PLAN OF CARE
Problem: BIRTH - VAGINAL/ SECTION  Goal: Fetal and maternal status remain reassuring during the birth process  Description: INTERVENTIONS:  - Monitor vital signs  - Monitor fetal heart rate  - Monitor uterine activity  - Monitor labor progression (vaginal delivery)  - DVT prophylaxis  - Antibiotic prophylaxis  Outcome: Completed  Goal: Emotionally satisfying birthing experience for mother/fetus  Description: Interventions:  - Assess, plan, implement and evaluate the nursing care given to the patient in labor  - Advocate the philosophy that each childbirth experience is a unique experience and support the family's chosen level of involvement and control during the labor process   - Actively participate in both the patient's and family's teaching of the birth process  - Consider cultural, Sikhism and age-specific factors and plan care for the patient in labor  Outcome: Completed     Problem: POSTPARTUM  Goal: Experiences normal postpartum course  Description: INTERVENTIONS:  - Monitor maternal vital signs  - Assess uterine involution and lochia  Outcome: Progressing  Goal: Appropriate maternal -  bonding  Description: INTERVENTIONS:  - Identify family support  - Assess for appropriate maternal/infant bonding   -Encourage maternal/infant bonding opportunities  - Referral to  or  as needed  Outcome: Progressing  Goal: Establishment of infant feeding pattern  Description: INTERVENTIONS:  - Assess breast/bottle feeding  - Refer to lactation as needed  Outcome: Progressing  Goal: Incision(s), wounds(s) or drain site(s) healing without S/S of infection  Description: INTERVENTIONS  - Assess and document dressing, incision, wound bed, drain sites and surrounding tissue  - Provide patient and family education  Outcome: Progressing

## 2024-08-24 NOTE — L&D DELIVERY NOTE
DELIVERY NOTE  Eboni Hilton 29 y.o. female MRN: [id number]  Unit/Bed#: -01 Encounter: [id number]    Obstetrician:  Raghav Romeo DO    Pre-Delivery Diagnosis: Light pregnancy in vertex presentation at 38w1d gestation.    Post-Delivery Diagnosis: Same, delivered    Procedure: Spontaneous vaginal delivery    Delivery Date and Time:  8/23/24 2115    Umbilical Artery  Recent Labs     08/23/24 2057   PHCART 7.228*   BECART -5.3*       Umbilical Vein  Recent Labs     08/23/24 2057   PHCVEN 7.317   BECVEN -6.6*       Apgars: 8 at 1 minute, 9 at 5 minutes    Weight: Pending           Complications: None    Description of Procedure:  Patient was found to be completely dilated and +1 station . She pushed for 57 minutes to spontaneously deliver a liveborn infant in  position through clear fluid at . The head and shoulders delivered easily, with the body easily delivering thereafter. Nuchal cord was easily reduced. The infant was placed on maternal abdomen. Cord clamping was delayed for 30 seconds, after which the cord was doubly clamped and cut. Routine cord gases and cord blood were collected. Pitocin was started for active management of the 3rd stage. Placenta delivered spontaneously and was noted to have a centrally-inserted 3-vessel cord. The placenta was sent to pathology. Bimanual exam was performed, and the fundus was noted to be firm. A thorough pelvic exam revealed a 1st degree laceration, which was repaired with 3-0 polyglactin suture in typical fashion. Excellent hemostasis was noted, with total QBL pending. All needle, sponge, and instrument counts were noted to be correct. The patient tolerated the procedure well and was allowed to recover in labor and delivery room.     Raghav Romeo DO  8/23/2024 9:15 PM

## 2024-08-25 VITALS
HEIGHT: 62 IN | DIASTOLIC BLOOD PRESSURE: 66 MMHG | HEART RATE: 62 BPM | RESPIRATION RATE: 18 BRPM | TEMPERATURE: 98.5 F | OXYGEN SATURATION: 97 % | BODY MASS INDEX: 36.25 KG/M2 | SYSTOLIC BLOOD PRESSURE: 127 MMHG | WEIGHT: 197 LBS

## 2024-08-25 PROCEDURE — 99024 POSTOP FOLLOW-UP VISIT: CPT | Performed by: OBSTETRICS & GYNECOLOGY

## 2024-08-25 PROCEDURE — NC001 PR NO CHARGE: Performed by: OBSTETRICS & GYNECOLOGY

## 2024-08-25 RX ORDER — ACETAMINOPHEN 325 MG/1
650 TABLET ORAL EVERY 4 HOURS PRN
Start: 2024-08-25

## 2024-08-25 RX ORDER — IBUPROFEN 600 MG/1
600 TABLET, FILM COATED ORAL EVERY 6 HOURS
Start: 2024-08-25

## 2024-08-25 RX ORDER — DOCUSATE SODIUM 100 MG/1
100 CAPSULE, LIQUID FILLED ORAL 2 TIMES DAILY
Start: 2024-08-25

## 2024-08-25 RX ADMIN — IBUPROFEN 600 MG: 600 TABLET, FILM COATED ORAL at 09:30

## 2024-08-25 RX ADMIN — IBUPROFEN 600 MG: 600 TABLET, FILM COATED ORAL at 03:54

## 2024-08-25 RX ADMIN — BENZOCAINE AND LEVOMENTHOL 1 APPLICATION: 200; 5 SPRAY TOPICAL at 07:40

## 2024-08-25 RX ADMIN — DOCUSATE SODIUM 100 MG: 100 CAPSULE, LIQUID FILLED ORAL at 09:30

## 2024-08-25 NOTE — DISCHARGE SUMMARY
Discharge Summary - Eboni Hilton 29 y.o. female MRN: [id number]    Unit/Bed#: -01 Encounter: [id number]    ADMISSION  Admission Date: 2024   Admitting Attending: Dr. Raghav SMITH DO  Admitting Diagnoses:   Patient Active Problem List   Diagnosis    37 weeks gestation of pregnancy    Headache in pregnancy, antepartum    False labor    PROM (premature rupture of membranes)    Spontaneous vaginal delivery    Pre-eclampsia, mild, antepartum, third trimester       DELIVERY  Delivery Method: Vaginal, Spontaneous   Delivery Date and Time: 2024 8:57 PM  Delivery Attending: Raghav Romeo    DISCHARGE  Discharge Date: 2024  Discharge Attending: Dr. Elaine Rodríguez MD  Discharge Diagnosis:   Same, Delivered    Clinical course: Admission to Delivery    Eboni Hilton is a 29 y.o.  who was admitted at 38w1d for augmentation of labor.    Reason for induction: PROM, Preeclampsia without severe features  .       Induction method: Pitocin    For pain control in labor, pt received epidural    The labor course was uncomplicated.  Delivery    Route of Delivery: Vaginal, Spontaneous      Anesthesia: Epidural,   QBL: Non-Surgical QBL (mL): 78      QBL:        Delivery: Vaginal, Spontaneous at 2024 8:57 PM  Laceration: Perineal: 1° Repaired? Yes    Baby's Weight: 2840 g (6 lb 4.2 oz); 100.18    Apgar scores: 8  and 9  at 1 and 5 minutes, respectively      Clinical Course: Post-Delivery:    The post delivery course was unremarkable.    On the day of discharge, the patient was ambulating, voiding spontaneously, tolerating oral intake, and hemodynamically stable. She was able to reasonably perform all ADLs. She had appropriate bowel function. Pain was well-controlled. She was discharged home on postpartum/postop day #2 without complications. Patient was instructed to follow up with her OB as an outpatient and was given appropriate warnings to call her provider with problems or  concerns.    Pertinent lab findings included:   Blood type Bpositive.     Last three Hgb values:  Lab Results   Component Value Date    HGB 13.2 08/24/2024    HGB 13.7 08/23/2024    HGB 13.9 08/16/2024        Problem-specific follow-up plans included the following:  Problem List       37 weeks gestation of pregnancy    Overview     Adacel received 6/26/2024         Headache in pregnancy, antepartum    False labor    PROM (premature rupture of membranes)    * (Principal) Spontaneous vaginal delivery    Pre-eclampsia, mild, antepartum, third trimester        Discharge med list:  Contraception: undecided     Medication List      START taking these medications     acetaminophen 325 mg tablet; Commonly known as: TYLENOL; Take 2 tablets   (650 mg total) by mouth every 4 (four) hours as needed for mild pain   docusate sodium 100 mg capsule; Commonly known as: COLACE; Take 1   capsule (100 mg total) by mouth 2 (two) times a day   ibuprofen 600 mg tablet; Commonly known as: MOTRIN; Take 1 tablet (600   mg total) by mouth every 6 (six) hours     CONTINUE taking these medications     Magnesium Oxide 400 MG Caps; Take 1 tablet (400 mg total) by mouth daily   Pepcid AC Maximum Strength 20 mg tablet; Generic drug: famotidine   PRENATAL VITAMINS PO   Riboflavin 400 MG Caps; Take 1 capsule (400 mg total) by mouth daily       Condition at discharge:   good     Disposition:   Home    Planned Readmission:   No    Discharge Statement   I spent 30 minutes or less discharging the patient. This time was spent on the day of discharge. I had direct contact with the patient on the day of discharge. Greater than 50% of the total time was spent examining patient, answering all patient questions, arranging and discussing plan of care with patient as well as directly providing post-discharge instructions. Additional time then spent on discharge activities.

## 2024-08-25 NOTE — PLAN OF CARE
Problem: PAIN - ADULT  Goal: Verbalizes/displays adequate comfort level or baseline comfort level  Description: Interventions:  - Encourage patient to monitor pain and request assistance  - Assess pain using appropriate pain scale  - Administer analgesics based on type and severity of pain and evaluate response  - Implement non-pharmacological measures as appropriate and evaluate response  - Consider cultural and social influences on pain and pain management  - Notify physician/advanced practitioner if interventions unsuccessful or patient reports new pain  8/25/2024 0947 by Elaine Torres RN  Outcome: Adequate for Discharge  8/25/2024 0947 by Elaine Torres RN  Outcome: Adequate for Discharge     Problem: INFECTION - ADULT  Goal: Absence or prevention of progression during hospitalization  Description: INTERVENTIONS:  - Assess and monitor for signs and symptoms of infection  - Monitor lab/diagnostic results  - Monitor all insertion sites, i.e. indwelling lines, tubes, and drains  - Monitor endotracheal if appropriate and nasal secretions for changes in amount and color  - [place] appropriate cooling/warming therapies per order  - Administer medications as ordered  - Instruct and encourage patient and family to use good hand hygiene technique  - Identify and instruct in appropriate isolation precautions for identified infection/condition  8/25/2024 0947 by Elaine Torres RN  Outcome: Adequate for Discharge  8/25/2024 0947 by Elaine Torres RN  Outcome: Adequate for Discharge  Goal: Absence of fever/infection during neutropenic period  Description: INTERVENTIONS:  - Monitor WBC    8/25/2024 0947 by Elaine Torres RN  Outcome: Adequate for Discharge  8/25/2024 0947 by Elaine Torres RN  Outcome: Adequate for Discharge     Problem: SAFETY ADULT  Goal: Patient will remain free of falls  Description: INTERVENTIONS:  - Educate patient/family on patient safety including physical limitations  - Instruct patient to call for  assistance with activity   - Consult OT/PT to assist with strengthening/mobility   - Keep Call bell within reach  - Keep bed low and locked with side rails adjusted as appropriate  - Keep care items and personal belongings within reach  - Initiate and maintain comfort rounds  - Consider moving patient to room near nurses station  8/25/2024 0947 by Elaine Torres RN  Outcome: Adequate for Discharge  8/25/2024 0947 by Elaine Torres RN  Outcome: Adequate for Discharge  Goal: Maintain or return to baseline ADL function  Description: INTERVENTIONS:  -  Assess patient's ability to carry out ADLs; assess patient's baseline for ADL function and identify physical deficits which impact ability to perform ADLs (bathing, care of mouth/teeth, toileting, grooming, dressing, etc.)  - Assess/evaluate cause of self-care deficits   - Assess range of motion  - Assess patient's mobility; develop plan if impaired  - Assess patient's need for assistive devices and provide as appropriate  - Encourage maximum independence but intervene and supervise when necessary  - Involve family in performance of ADLs  - Assess for home care needs following discharge   - Consider OT consult to assist with ADL evaluation and planning for discharge  - Provide patient education as appropriate  8/25/2024 0947 by Elaine Torres RN  Outcome: Adequate for Discharge  8/25/2024 0947 by Elaine Torres RN  Outcome: Adequate for Discharge  Goal: Maintains/Returns to pre admission functional level  Description: INTERVENTIONS:  - Perform AM-PAC 6 Click Basic Mobility/ Daily Activity assessment daily.  - Set and communicate daily mobility goal to care team and patient/family/caregiver.   - Collaborate with rehabilitation services on mobility goals if consulted  - Out of bed for toileting  - Record patient progress and toleration of activity level   8/25/2024 0947 by Elaine Torres RN  Outcome: Adequate for Discharge  8/25/2024 0947 by Elaine Torres RN  Outcome: Adequate  for Discharge     Problem: Knowledge Deficit  Goal: Patient/family/caregiver demonstrates understanding of disease process, treatment plan, medications, and discharge instructions  Description: Complete learning assessment and assess knowledge base.  Interventions:  - Provide teaching at level of understanding  - Provide teaching via preferred learning methods  2024 by Elaine Torres RN  Outcome: Adequate for Discharge  2024 by Elaine Torres RN  Outcome: Adequate for Discharge     Problem: DISCHARGE PLANNING  Goal: Discharge to home or other facility with appropriate resources  Description: INTERVENTIONS:  - Identify barriers to discharge w/patient and caregiver  - Arrange for needed discharge resources and transportation as appropriate  - Identify discharge learning needs (meds, wound care, etc.)  - Arrange for interpretive services to assist at discharge as needed  - Refer to Case Management Department for coordinating discharge planning if the patient needs post-hospital services based on physician/advanced practitioner order or complex needs related to functional status, cognitive ability, or social support system  2024 by Elaine Torres RN  Outcome: Adequate for Discharge  2024 by Elaine Torres RN  Outcome: Adequate for Discharge     Problem: POSTPARTUM  Goal: Experiences normal postpartum course  Description: INTERVENTIONS:  - Monitor maternal vital signs  - Assess uterine involution and lochia  2024 by Elaine Torres RN  Outcome: Adequate for Discharge  2024 by Elaine Torres RN  Outcome: Adequate for Discharge  Goal: Appropriate maternal -  bonding  Description: INTERVENTIONS:  - Identify family support  - Assess for appropriate maternal/infant bonding   -Encourage maternal/infant bonding opportunities  - Referral to  or  as needed  2024 by Elaine Torres RN  Outcome: Adequate for Discharge  2024 by  Elaine Torres RN  Outcome: Adequate for Discharge  Goal: Establishment of infant feeding pattern  Description: INTERVENTIONS:  - Assess breast/bottle feeding  - Refer to lactation as needed  8/25/2024 0947 by Elaine Torres RN  Outcome: Adequate for Discharge  8/25/2024 0947 by Elaine Torres RN  Outcome: Adequate for Discharge  Goal: Incision(s), wounds(s) or drain site(s) healing without S/S of infection  Description: INTERVENTIONS  - Assess and document dressing, incision, wound bed, drain sites and surrounding tissue  - Provide patient and family education  8/25/2024 0947 by Elaine Torres RN  Outcome: Adequate for Discharge  8/25/2024 0947 by Elaine Torres RN  Outcome: Adequate for Discharge

## 2024-08-25 NOTE — LACTATION NOTE
This note was copied from a baby's chart.  CONSULT - LACTATION  Baby Girl (Villa Hilton 2 days female MRN: [id number]    [facility] NURSERY Room / Bed: (N)/(N) Encounter: [id number]    Maternal Information     MOTHER:  Eboni Hilton  Maternal Age: 29 y.o.  OB History: # 1 - Date: 08/23/24, Sex: Female, Weight: 2840 g (6 lb 4.2 oz), GA: 38w1d, Type: Vaginal, Spontaneous, Apgar1: 8, Apgar5: 9, Living: Living, Birth Comments: None   Previouse breast reduction surgery? No    Lactation history:   Has patient previously breast fed: No   How long had patient previously breast fed:     Previous breast feeding complications:       Past Surgical History:   Procedure Laterality Date    FL LUMBAR PUNCTURE DIAGNOSTIC  4/18/2016    TONSILECTOMY AND ADNOIDECTOMY         Birth information:  YOB: 2025   Time of birth: 8:57 PM   Sex: female   Delivery type: Vaginal, Spontaneous   Birth Weight: 2840 g (6 lb 4.2 oz)   Percent of Weight Change: -6%     Gestational Age: 38w1d      08/25/24 1200   Lactation Consultation   Reason for Consult 20;20 minutes   Lactation Consultant Total Time 40   Maternal Information   Has mother  before? No   Breasts/Nipples   Left Breast Filling   Right Breast Filling   Breastfeeding Status Yes   Breast Pump   Pump 3  (Has Medela Free Style and Manual breast pump at home.)   Patient Follow-Up   Lactation Consult Status 2   Follow-Up Type Inpatient;Call as needed   Other OB Lactation Documentation    Additional Problem Noted Eboni reports breastfeeding is going well Chayo and for her. Eboni reports she took prenatal breastfeeding class prior to delivery.  (Reviewed RSB and D/C booklets)       Feeding recommendations:  breast feed on demand    Met with Dyad. Provided  with Ready, Set, Baby booklet which contained information on:  Hand expression with access to QR codes to review hand expression.  Positioning and latch reviewed as  well as showing images of other feeding positions.  Discussed the properties of a good latch in any position.   Feeding on cue and what that means for recognizing infant's hunger, s/s that baby is getting enough milk and some s/s that breastfeeding dyad may need further help  Skin to Skin contact and benefits to mom and baby  Avoidance of pacifiers for the first month discussed.   Gave information on common concerns, what to expect the first few weeks after delivery, preparing for other caregivers, and how partners can help. Resources for support also provided.    Also reviewed discharge breastfeeding booklet including the feeding log. Emphasized 8 or more (12) feedings in a 24 hour period, what to expect for the number of diapers per day of life and the progression of properties of the  stooling pattern.    List of reasons to call a lactation consultant.  Feeding logs  Feeding cues  Hand expression  Baby's Second day (cluster feeding)  Breastfeeding and Your Lifestyle (Medications, Alcohol, Caffeine, Smoking, Street Drugs, Methadone)  First Two Weeks Survival Guide for Breastfeeding  Breast Changes  Physical Therapy  Storage and Handling of Breast milk  How to Keep Your Breast Pump Kit Clean  The Employed Breastfeeding Mother  Mixed feeding  Bottle feeding like breastfeeding (paced bottle feeding)  astfeeding and your lifestyle, storage and preparation of breast milk, how to keep you breast pump clean, the employed breastfeeding mother and paced bottle feeding handouts.     Booklet included Breastfeeding Resources for after discharge including access to the number for the [facility].    Encouraged parents to call for assistance, questions, and concerns about breastfeeding.  Extension provided.      Marlen Mejia RN 2024 12:07 PM

## 2024-08-25 NOTE — PROGRESS NOTES
"Progress Note - OB/GYN   Eboni Hilton 29 y.o. female MRN: [id number]  Unit/Bed#: -01 Encounter: [id number]    Assessment:  PPD # 2     Plan:  Home today  Discharge instructions reviewed    Subjective/Objective     Subjective: Pain controlled.  Minimal lochia.    Objective:     Vitals: Blood pressure 127/66, pulse 62, temperature 98.5 °F (36.9 °C), temperature source Temporal, resp. rate 18, height 5' 2\" (1.575 m), weight 89.4 kg (197 lb), last menstrual period 2023, SpO2 97%, currently breastfeeding.,Body mass index is 36.03 kg/m².    No intake or output data in the 24 hours ending 24 0813    Physical Exam  Constitutional:       Appearance: Normal appearance.   HENT:      Head: Normocephalic.   Cardiovascular:      Rate and Rhythm: Normal rate and regular rhythm.   Pulmonary:      Effort: Pulmonary effort is normal.   Abdominal:      Palpations: Abdomen is soft.      Tenderness: There is no abdominal tenderness.      Comments: Fundus firm below umbilicus   Musculoskeletal:         General: No swelling.   Neurological:      General: No focal deficit present.      Mental Status: She is alert and oriented to person, place, and time.   Skin:     General: Skin is warm and dry.   Psychiatric:         Mood and Affect: Mood normal.         Behavior: Behavior normal.   Vitals reviewed.              "

## 2024-08-26 NOTE — UTILIZATION REVIEW
"    MOTHER AND BABY DISCHARGED AUG 25    NOTIFICATION OF INPATIENT ADMISSION   MATERNITY/DELIVERY AUTHORIZATION REQUEST   SERVICING FACILITY:   Counts include 234 beds at the [facility] - L&D, [place], NICU  [address] [place] [address]  Tax ID: [id number]  NPI: [id number] ATTENDING PROVIDER:  Attending Name and NPI#: Raghav SMITH Do [[id number]]  Address: [address] [place] [address]  Phone: [phone]     ADMISSION INFORMATION:  Place of Service: Inpatient [facility]  Place of Service Code: 21  Inpatient Admission Date/Time: 24 12:27 PM  Discharge Date/Time: 2024 12:10 PM  Admitting Diagnosis Code/Description:  No admission diagnoses are documented for this encounter.     Mother: Eboni Hilton 1995 Estimated Date of Delivery: 24  Delivering clinician: Raghav Romeo   OB History          1    Para   1    Term   1       0    AB   0    Living   1         SAB   0    IAB   0    Ectopic   0    Multiple   0    Live Births   1                Name & MRN:   Information for the patient's :  Lida, Baby Girl (Eboni) [[id number]]   [place] Delivery Information:  Sex: female  Delivered 2024 8:57 PM by Vaginal, Spontaneous; Gestational Age: 38w1d     Measurements:  Weight: 6 lb 4.2 oz (2840 g);  Height: 19.5\"    APGAR 1 minute 5 minutes 10 minutes   Totals: 8 9       UTILIZATION REVIEW CONTACT:  Mary Daigle, Utilization   Network Utilization Review Department  Phone: [phone]  Fax [phone]  Email: [email or web address]  Contact for approvals/pending authorizations, clinical reviews, and discharge.     PHYSICIAN ADVISORY SERVICES:  Medical Necessity Denial & Vbrn-qg-Pyad Review  Phone: [phone]  Fax: [phone]  Email: [email or web address]     DISCHARGE SUPPORT TEAM:  For Patients Discharge Needs & Updates  Phone: [phone] opt. 2 Fax: [phone]  Email: " [email or web address]      Discharge Summary - Eboni Hilton 29 y.o. female MRN: [id number]     Unit/Bed#: -01 Encounter: [id number]     ADMISSION  Admission Date: 2024   Admitting Attending: Dr. Raghav SMITH DO  Admitting Diagnoses:   Problem List       Patient Active Problem List   Diagnosis    37 weeks gestation of pregnancy    Headache in pregnancy, antepartum    False labor    PROM (premature rupture of membranes)    Spontaneous vaginal delivery    Pre-eclampsia, mild, antepartum, third trimester            DELIVERY  Delivery Method: Vaginal, Spontaneous   Delivery Date and Time: 2024 8:57 PM  Delivery Attending: Raghav Romeo     DISCHARGE  Discharge Date: 2024  Discharge Attending: Dr. Elaine Rodríguez MD  Discharge Diagnosis:   Same, Delivered     Clinical course: Admission to Delivery     Eboni Hilton is a 29 y.o.  who was admitted at 38w1d for augmentation of labor.     Reason for induction: PROM, Preeclampsia without severe features  .         Induction method: Pitocin     For pain control in labor, pt received epidural     The labor course was uncomplicated.  Delivery     Route of Delivery: Vaginal, Spontaneous        Anesthesia: Epidural,   QBL: Non-Surgical QBL (mL): 78      QBL:         Delivery: Vaginal, Spontaneous at 2024 8:57 PM  Laceration: Perineal: 1° Repaired? Yes     Baby's Weight: 2840 g (6 lb 4.2 oz); 100.18    Apgar scores: 8  and 9  at 1 and 5 minutes, respectively        Clinical Course: Post-Delivery:     The post delivery course was unremarkable.     On the day of discharge, the patient was ambulating, voiding spontaneously, tolerating oral intake, and hemodynamically stable. She was able to reasonably perform all ADLs. She had appropriate bowel function. Pain was well-controlled. She was discharged home on postpartum/postop day #2 without complications. Patient was instructed to follow up with her OB as an outpatient and was given  appropriate warnings to call her provider with problems or concerns.     Pertinent lab findings included:   Blood type Bpositive.      Last three Hgb values:        Lab Results   Component Value Date     HGB 13.2 08/24/2024     HGB 13.7 08/23/2024     HGB 13.9 08/16/2024         Problem-specific follow-up plans included the following:  Problem List         37 weeks gestation of pregnancy     Overview       Adacel received 6/26/2024           Headache in pregnancy, antepartum     False labor     PROM (premature rupture of membranes)     * (Principal) Spontaneous vaginal delivery     Pre-eclampsia, mild, antepartum, third trimester         Discharge med list:  Contraception: undecided     Medication List      START taking these medications     acetaminophen 325 mg tablet; Commonly known as: TYLENOL; Take 2 tablets   (650 mg total) by mouth every 4 (four) hours as needed for mild pain   docusate sodium 100 mg capsule; Commonly known as: COLACE; Take 1   capsule (100 mg total) by mouth 2 (two) times a day   ibuprofen 600 mg tablet; Commonly known as: MOTRIN; Take 1 tablet (600   mg total) by mouth every 6 (six) hours     CONTINUE taking these medications     Magnesium Oxide 400 MG Caps; Take 1 tablet (400 mg total) by mouth daily   Pepcid AC Maximum Strength 20 mg tablet; Generic drug: famotidine   PRENATAL VITAMINS PO   Riboflavin 400 MG Caps; Take 1 capsule (400 mg total) by mouth daily        Condition at discharge:   good      Disposition:   Home     Planned Readmission:   No     Discharge Statement   I spent 30 minutes or less discharging the patient. This time was spent on the day of discharge. I had direct contact with the patient on the day of discharge. Greater than 50% of the total time was spent examining patient, answering all patient questions, arranging and discussing plan of care with patient as well as directly providing post-discharge instructions. Additional time then spent on discharge activities.

## 2024-08-26 NOTE — ANESTHESIA POSTPROCEDURE EVALUATION
Post-Op Assessment Note    CV Status:  Stable           Post Op Vitals Reviewed: Yes    No anethesia notable event occurred.    Staff: Anesthesiologist               BP      Temp      Pulse     Resp      SpO2      Pt did fine with epidural. Catheter was pulled out intact post delivery without any complications

## 2024-08-28 PROCEDURE — 88307 TISSUE EXAM BY PATHOLOGIST: CPT | Performed by: PATHOLOGY

## 2024-09-13 ENCOUNTER — POSTPARTUM VISIT (OUTPATIENT)
Dept: OBGYN CLINIC | Facility: CLINIC | Age: 29
End: 2024-09-13

## 2024-09-13 VITALS
BODY MASS INDEX: 32.94 KG/M2 | WEIGHT: 179 LBS | SYSTOLIC BLOOD PRESSURE: 122 MMHG | DIASTOLIC BLOOD PRESSURE: 64 MMHG | HEIGHT: 62 IN

## 2024-09-13 PROCEDURE — 99024 POSTOP FOLLOW-UP VISIT: CPT | Performed by: OBSTETRICS & GYNECOLOGY

## 2024-09-13 NOTE — PROGRESS NOTES
"[facility] OB/[facility]N [address], [address], [place] [address]    Assessment/Plan:  Eboni is a 29 y.o. year old  who presents for postpartum visit.    Routine Postpartum Care  Normal postpartum exam  Contraception: IUD Mirena  Depression Screen: 1  Feeding: breastfeeding  Cervical cancer screening Up to Date  RTO for Mirena insertion  Follow up in: 3 months for annual exam or as needed.    Additional Problems:  1. Encounter for postpartum visit        Subjective:     CC: Postpartum visit    Eboni Hilton is a 29 y.o. y.o. female  who presents for a postpartum visit.     She is 3 weeks postpartum following a  on 24 at 38.1 weeks. Postpartum course has been uncomplicated.     Bleeding no bleeding. Bowel function is normal. Bladder function is normal. Patient is not sexually active.     Postpartum Depression: Low Risk  (2024)    [place]  Depression Scale     Last EPDS Total Score: 1     Last EPDS Self Harm Result: Never       The following portions of the patient's history were reviewed and updated as appropriate: allergies, current medications, past family history, past medical history, obstetric history, gynecologic history, past social history, past surgical history and problem list.    Objective:  /64 (BP Location: Left arm, Patient Position: Sitting, Cuff Size: Standard)   Ht 5' 2\" (1.575 m)   Wt 81.2 kg (179 lb)   LMP 2023 (Exact Date)   Breastfeeding Yes   BMI 32.74 kg/m²   Pregravid Weight/BMI: 72.6 kg (160 lb) (BMI 29.26)  Current Weight: 81.2 kg (179 lb)   Total Weight Gain: 16.8 kg (37 lb)   Pre- Vitals      Flowsheet Row Most Recent Value   Prenatal Assessment    Prenatal Vitals    Blood Pressure 122/64   Weight - Scale 81.2 kg (179 lb)   Urine Albumin/Glucose    Dilation/Effacement/Station    Vaginal Drainage    Edema                General Appearance: alert and oriented, in no acute distress.   Abdomen: Soft, non-tender, " non-distended, no masses, no rebound or guarding.  Pelvic:       External genitalia: Normal appearance, no abnormal pigmentation, no lesions or masses. Normal Bartholin's and [place]ains[place]'s. Obstetric laceration well-healed.       Urinary system: Urethral meatus normal, bladder non-tender.      Vaginal: normal mucosa without prolapse or lesions. Normal-appearing physiologic discharge.  Extremities: Normal range of motion.   Skin: normal, no rash or abnormalities  Neurologic: alert, oriented x3  Psychiatric: Appropriate affect, mood stable, cooperative with exam.        Raghav Romeo,   9/13/2024 2:11 PM

## 2024-09-24 ENCOUNTER — POSTPARTUM VISIT (OUTPATIENT)
Dept: OBGYN CLINIC | Facility: CLINIC | Age: 29
End: 2024-09-24

## 2024-09-24 ENCOUNTER — NURSE TRIAGE (OUTPATIENT)
Age: 29
End: 2024-09-24

## 2024-09-24 VITALS
SYSTOLIC BLOOD PRESSURE: 114 MMHG | HEIGHT: 62 IN | BODY MASS INDEX: 33.13 KG/M2 | DIASTOLIC BLOOD PRESSURE: 70 MMHG | WEIGHT: 180 LBS

## 2024-09-24 DIAGNOSIS — K59.09 OTHER CONSTIPATION: ICD-10-CM

## 2024-09-24 PROCEDURE — 99024 POSTOP FOLLOW-UP VISIT: CPT | Performed by: PHYSICIAN ASSISTANT

## 2024-09-24 NOTE — ASSESSMENT & PLAN NOTE
Reviewed small area of granulation tissue. Patient reports symptoms improving in the last few days. Should heal on own. Continue to monitor if still present at 6 weeks with IUD insertion can consider silver nitrate on area.

## 2024-09-24 NOTE — TELEPHONE ENCOUNTER
"Patient delivered 24 via . States she had first degree tear. Patient states when she was evaluated at her routine postpartum appointment, provider mentioned stitches dissolved and area healing well, but appeared a bit raw.    Patient states for the past few days, noticing discomfort on one side when wiping and walking. Patient states discomfort was at it's worst , rated discomfort about 5/10. Patient states when she wiped in area of discomfort on Monday, she saw thick yellow/brown mucus. Patient states discomfort seemed to have diminish since then, but still persists slightly at about 1-2/10 with wiping. Patient states if she presses in area of discomfort, she will still see some yellow/brown mucus.    Patient concerned about possible infection or abscess. Denies foul odor, fevers/chills.   Appointment scheduled today for evaluation. Patient prefers [place] location - her daughter had pediatrician appointment at 3 pm. Patient states spouse plans to meet her at sheduled appointment for today and can take daughter to pediatrician if patient is not finished with appointment in time.   Reason for Disposition   [1] Perineum pain (area between vagina and anus) AND [2] interferes with normal activities (e.g., walking, sitting) AND [3] > 14 days since delivery    Answer Assessment - Initial Assessment Questions  1. SYMPTOM: \"What's the main symptom you're concerned about?\" (e.g., pain, stitch tightness, swelling)      Discomfort, discharge  2. ONSET: \"When did the  s/s  start?\"      Past several days  3. APPEARANCE OF PERINEUM: \"How does the episiotomy or wound look?\" (e.g., abnormal discharge, broken stitch, gaping area or opening of incision, lump)      Abnormal discharge  4. DELIVERY DATE: \"When was your delivery date?\"      24  5. PAIN: \"Is there any pain?\" If Yes, ask: \"How bad is it?\" (Scale: 1-10; mild, moderate, severe)      1-2/10 discomfort currently  6. FEVER: \"Do you have a fever?\" If " "Yes, ask: \"What is your temperature, how was it measured, and when did it start?\"      Denies  7. OTHER SYMPTOMS: \"Do you have any other symptoms?\" (e.g., abdomen pain, vaginal discharge, pain with urination)      Denies    Protocols used: Postpartum - Episiotomy or Vaginal Laceration Symptoms-Adult-AH    "

## 2024-09-24 NOTE — PROGRESS NOTES
[facility] OB/GYN [facility]  [address], [address], [place] [address]    ASSESSMENT/PLAN:     1. Spontaneous vaginal delivery  Assessment & Plan:  Reviewed small area of granulation tissue. Patient reports symptoms improving in the last few days. Should heal on own. Continue to monitor if still present at 6 weeks with IUD insertion can consider silver nitrate on area.     2. Other constipation  Assessment & Plan:  Reviewed constipation. Suspect anal fissure with sharp pain with bowel movements. Recommend colace 1-2 x's daily and hydration. Reassured typically heal on their own within 4-6 weeks. If continuing beyond that recommend follow up with colorectal.       CC:  pain at vaginal tear.     HPI: Eboni Hilton is a 29 y.o.  who presents for tenderness on left side of vaginal tear. Had 1st degree tear. Reports noticed tenderness on left side when wiping. States was more uncomfortable a few days ago and has since improved. Reports yellow discharge. No odors or irritation.   Denies fever, chills.   Denies bladder issues.   Reports constipation, feels like she may have hemorrhoid or fissure. Very sharp pain with bowel movements with some bleeding and discomfort for about 30 minutes after a bowel movement.     Currently breastfeeding.     ROS: Negative except as noted in HPI    Patient's last menstrual period was 2023 (exact date).       She  reports being sexually active and has had partner(s) who are male. She reports using the following method of birth control/protection: None.       The following portions of the patient's history were reviewed and updated as appropriate:   Past Medical History:   Diagnosis Date    Varicella     as child     Past Surgical History:   Procedure Laterality Date    FL LUMBAR PUNCTURE DIAGNOSTIC  2016    TONSILECTOMY AND ADNOIDECTOMY       Family History   Problem Relation Age of Onset    Hyperlipidemia Maternal Grandmother     Lung cancer Maternal  Grandmother     Glaucoma Maternal Grandmother      Social History     Socioeconomic History    Marital status: /Civil Union     Spouse name: None    Number of children: None    Years of education: None    Highest education level: None   Occupational History    None   Tobacco Use    Smoking status: Never     Passive exposure: Never    Smokeless tobacco: Never   Vaping Use    Vaping status: Never Used   Substance and Sexual Activity    Alcohol use: Not Currently     Alcohol/week: 3.0 standard drinks of alcohol     Types: 3 Glasses of wine per week     Comment: socially    Drug use: Never    Sexual activity: Yes     Partners: Male     Birth control/protection: None     Comment: no new partner in past year   Other Topics Concern    None   Social History Narrative    Exercise: Occassionally    Domestic violence: No    History of drug/alcohol abuse denies         Social Determinants of Health     Financial Resource Strain: Not on file   Food Insecurity: No Food Insecurity (5/16/2024)    Hunger Vital Sign     Worried About Running Out of Food in the Last Year: Never true     Ran Out of Food in the Last Year: Never true   Transportation Needs: No Transportation Needs (5/16/2024)    PRAPARE - Transportation     Lack of Transportation (Medical): No     Lack of Transportation (Non-Medical): No   Physical Activity: Not on file   Stress: Not on file   Social Connections: Not on file   Intimate Partner Violence: Not on file   Housing Stability: Low Risk  (5/16/2024)    Housing Stability Vital Sign     Unable to Pay for Housing in the Last Year: No     Number of Times Moved in the Last Year: 1     Homeless in the Last Year: No     Outpatient Medications Marked as Taking for the 9/24/24 encounter (Postpartum Visit) with Martha Cantu PA-C   Medication    Prenatal Vit-Fe Fumarate-FA (PRENATAL VITAMINS PO)     Allergies   Allergen Reactions    Amoxicillin Other (See Comments)    Amoxicillin-Pot Clavulanate Other (See  "Comments)    Cefdinir Other (See Comments)    Tetanus Toxoids Swelling     Got shot in 2013.Received IV meds for swelling           Objective:  /70 (BP Location: Right arm, Patient Position: Sitting, Cuff Size: Standard)   Ht 5' 2\" (1.575 m)   Wt 81.6 kg (180 lb)   LMP 11/30/2023 (Exact Date)   Breastfeeding Yes   BMI 32.92 kg/m²        Chaperone present? Offered, declines.    Physical Exam  Constitutional:       Appearance: She is well-developed.   Genitourinary:         HENT:      Head: Normocephalic and atraumatic.   Neck:      Thyroid: No thyromegaly.   Cardiovascular:      Rate and Rhythm: Normal rate and regular rhythm.      Heart sounds: Normal heart sounds. No murmur heard.     No friction rub. No gallop.   Pulmonary:      Effort: Pulmonary effort is normal. No respiratory distress.      Breath sounds: Normal breath sounds. No wheezing.   Abdominal:      General: There is no distension.      Palpations: Abdomen is soft. There is no mass.      Tenderness: There is no abdominal tenderness. There is no guarding or rebound.      Hernia: No hernia is present.   Lymphadenopathy:      Cervical: No cervical adenopathy.   Neurological:      Mental Status: She is alert and oriented to person, place, and time.   Skin:     General: Skin is warm and dry.   Psychiatric:         Behavior: Behavior normal.             Martha Cantu PA-C  9/24/2024 3:09 PM    "

## 2024-09-24 NOTE — ASSESSMENT & PLAN NOTE
Reviewed constipation. Suspect anal fissure with sharp pain with bowel movements. Recommend colace 1-2 x's daily and hydration. Reassured typically heal on their own within 4-6 weeks. If continuing beyond that recommend follow up with colorectal.

## 2024-10-18 ENCOUNTER — PROCEDURE VISIT (OUTPATIENT)
Dept: OBGYN CLINIC | Facility: CLINIC | Age: 29
End: 2024-10-18
Payer: COMMERCIAL

## 2024-10-18 VITALS
DIASTOLIC BLOOD PRESSURE: 80 MMHG | WEIGHT: 185 LBS | HEIGHT: 62 IN | SYSTOLIC BLOOD PRESSURE: 120 MMHG | BODY MASS INDEX: 34.04 KG/M2

## 2024-10-18 DIAGNOSIS — Z30.430 ENCOUNTER FOR IUD INSERTION: Primary | ICD-10-CM

## 2024-10-18 PROCEDURE — 58300 INSERT INTRAUTERINE DEVICE: CPT | Performed by: OBSTETRICS & GYNECOLOGY

## 2024-10-18 NOTE — PROGRESS NOTES
IUD Procedure    Date/Time: 10/18/2024 1:05 PM    Performed by: Raghav SMITH DO  Authorized by: Raghav Romeo V, DO    Other Assisting Provider: No    Verbal consent obtained?: Yes    Written consent obtained?: Yes    Risks and benefits: Risks, benefits and alternatives were discussed    Consent given by:  Patient  Time Out:     Time out: Immediately prior to the procedure a time out was called    Patient states understanding of procedure being performed: Yes    Patient's understanding of procedure matches consent: Yes    Procedure consent matches procedure scheduled: Yes    Radiology Images displayed and confirmed. If images not available, report reviewed: Yes    Required items: Required blood products, implants, devices and special equipment available    Patient identity confirmed:  Verbally with patient  Select procedure: IUD insertion    IUD Insertion:     Pelvic exam performed: yes      Negative urine pregnancy test: no (pt not sexually active since birth)      Cervix cleaned and prepped: yes      Speculum placed in vagina: yes      Tenaculum applied to cervix: yes      IUD inserted with no complications: yes      Strings trimmed: yes      Uterus sounded: yes      Uterus sound depth (cm):  8    IUD type:  1 each Levonorgestrel 20 MCG/DAY  Post-procedure:     Patient tolerated procedure well: yes      Patient will follow up after next period: yes

## 2024-10-22 ENCOUNTER — TELEPHONE (OUTPATIENT)
Age: 29
End: 2024-10-22

## 2024-10-22 NOTE — TELEPHONE ENCOUNTER
MANDA Patient has 12 weeks short-term disability.  Patient has her STD approved for six.  An extension will be sent to the office.

## 2024-10-23 NOTE — TELEPHONE ENCOUNTER
Patient returned call, relayed Chaim's message regarding standard PP leave.   Pt inquired since they had additional concerns (9/24 add' PP visit) with granular tissue & anal fissures contributing to pain/ longer recovery if this qualifies for additional leave time? Pt expected return date 11/15 (12wks)   Attempted to contact office, Jessenia recommends to forward to Chaim.

## 2024-10-24 NOTE — TELEPHONE ENCOUNTER
Spoke to Eboni and advised to make an appointment to be evaluated in order to determine if her postpartum leave of 6 weeks is able to be extended do to pelvic pain. Eboni will call back after she thinks about it.

## 2024-10-25 NOTE — TELEPHONE ENCOUNTER
I spoke with patient today as she was scheduled in the office. She did not need an office visit. She is requesting an extension until 10/18/24 for STD, which is fine. She was seen with Martha initially who did diagnose granulation tissue. When I saw her on 10/18, it had resolved so just need an extension until 10/18. Please update paperwork. Thanks!

## 2024-10-28 NOTE — TELEPHONE ENCOUNTER
Spoke to patient and does agree to revise dates on original [facility] form and she will notify them that this is being done. Form revised and forwarded to [facility].

## 2024-11-11 ENCOUNTER — OFFICE VISIT (OUTPATIENT)
Dept: OBGYN CLINIC | Facility: CLINIC | Age: 29
End: 2024-11-11
Payer: COMMERCIAL

## 2024-11-11 VITALS
SYSTOLIC BLOOD PRESSURE: 112 MMHG | HEIGHT: 62 IN | DIASTOLIC BLOOD PRESSURE: 74 MMHG | WEIGHT: 185 LBS | BODY MASS INDEX: 34.04 KG/M2

## 2024-11-11 DIAGNOSIS — T83.32XA INTRAUTERINE CONTRACEPTIVE DEVICE THREADS LOST, INITIAL ENCOUNTER: Primary | ICD-10-CM

## 2024-11-11 PROCEDURE — 99213 OFFICE O/P EST LOW 20 MIN: CPT | Performed by: OBSTETRICS & GYNECOLOGY
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
## 2024-11-13 ENCOUNTER — HOSPITAL ENCOUNTER (OUTPATIENT)
Dept: ULTRASOUND IMAGING | Facility: HOSPITAL | Age: 29
Discharge: HOME | End: 2024-11-13
Payer: COMMERCIAL

## 2024-11-13 DIAGNOSIS — T83.32XA INTRAUTERINE CONTRACEPTIVE DEVICE THREADS LOST, INITIAL ENCOUNTER: ICD-10-CM

## 2024-11-13 PROCEDURE — 76830 TRANSVAGINAL US NON-OB: CPT

## 2024-11-13 PROCEDURE — 76856 US EXAM PELVIC COMPLETE: CPT

## 2024-11-14 ENCOUNTER — RESULTS FOLLOW-UP (OUTPATIENT)
Dept: OBGYN CLINIC | Facility: CLINIC | Age: 29
End: 2024-11-14

## 2025-01-10 ENCOUNTER — ANNUAL EXAM (OUTPATIENT)
Dept: OBGYN CLINIC | Facility: CLINIC | Age: 30
End: 2025-01-10
Payer: COMMERCIAL

## 2025-01-10 VITALS
BODY MASS INDEX: 32.39 KG/M2 | DIASTOLIC BLOOD PRESSURE: 68 MMHG | HEIGHT: 62 IN | WEIGHT: 176 LBS | SYSTOLIC BLOOD PRESSURE: 114 MMHG

## 2025-01-10 DIAGNOSIS — Z01.419 WELL WOMAN EXAM: Primary | ICD-10-CM

## 2025-01-10 PROCEDURE — S0612 ANNUAL GYNECOLOGICAL EXAMINA: HCPCS | Performed by: OBSTETRICS & GYNECOLOGY
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
## 2025-01-15 LAB
CLINICAL INFO: NORMAL
CYTO CVX: NORMAL
CYTOLOGY CMNT CVX/VAG CYTO-IMP: NORMAL
DATE PREVIOUS BX: NORMAL
HPV E6+E7 MRNA CVX QL NAA+PROBE: NOT DETECTED
LMP START DATE: NORMAL
SL AMB PREV. PAP:: NORMAL
SPECIMEN SOURCE CVX/VAG CYTO: NORMAL

## 2025-01-16 ENCOUNTER — RESULTS FOLLOW-UP (OUTPATIENT)
Dept: LABOR AND DELIVERY | Facility: HOSPITAL | Age: 30
End: 2025-01-16

## 2025-03-11 ENCOUNTER — TELEPHONE (OUTPATIENT)
Dept: POSTPARTUM | Facility: CLINIC | Age: 30
End: 2025-03-11

## 2025-03-11 NOTE — TELEPHONE ENCOUNTER
Eboni left message last night - she currently has a cold, very nasally congested and questioning what OTC medications are safe to take while breastfeeding Chayo (6mth) and that wont affect her milk supply.  She is currently nursing & pumping for Chayo.

## 2025-03-11 NOTE — TELEPHONE ENCOUNTER
I reviewed the following information with Eboni:    Over-The-Counter Medications During Lactation     Cold Medications   Avoid all oral and intranasal products with decongestants such as pseudoephedrine and phenylephrine, because these can cause a significant decrease in milk production.   Preferred medications are:   1) Guaifenesin for nasal congestion and cough   2) Dextromethorphan for a cough   3) Nasal saline preparations     I encouraged her to call back with any additional questions or concerns.

## 2025-05-08 ENCOUNTER — NURSE TRIAGE (OUTPATIENT)
Age: 30
End: 2025-05-08

## 2025-05-08 NOTE — TELEPHONE ENCOUNTER
"FOLLOW UP: Patient is requesting antibiotics for right tympanic membrane bulging and ear discomfort    REASON FOR CONVERSATION: Ear Fullness    SYMPTOMS: Ear discomfort and fullness for 2 days. Had nurse practitioner at work look in ear and told right tympanic membrane is bulging. Patient unable to come in for office visit, would like antibiotics sent. Patient is requesting amoxicillin as she is breastfeeding. Aware it is listed as an allergy, patient states it was only a rash as a child and still wishes to try.    OTHER: N/A    DISPOSITION: See Today or Tomorrow in Office      Reason for Disposition   All other earaches (Exceptions: Brief ear pain lasting < 1 hour, and earache occurring during air travel.)    Answer Assessment - Initial Assessment Questions  1. LOCATION: \"Which ear is involved?\"      Right ear  2. ONSET: \"When did the ear pain start?\"       About 2 days ago  3. SEVERITY: \"How bad is the pain?\"  (Scale 1-10; mild, moderate or severe)      Mild, just discomfort  4. URI SYMPTOMS: \"Do you have a runny nose or cough?\"      denies  5. FEVER: \"Do you have a fever?\" If Yes, ask: \"What is your temperature, how was it measured, and when did it start?\"      denies  6. CAUSE: \"Have you been swimming recently?\", \"How often do you use Q-TIPS?\", \"Have you had any recent air travel or scuba diving?\"      Denies all  7. OTHER SYMPTOMS: \"Do you have any other symptoms?\" (e.g., decreased hearing, dizziness, headache, stiff neck, vomiting)      Had nurse practitioner at work take a look and told tympanic membrane bulging   8. PREGNANCY: \"Is there any chance you are pregnant?\" \"When was your last menstrual period?\"      denies    Protocols used: Earache-Adult-[place]    "